# Patient Record
Sex: MALE | Race: WHITE | NOT HISPANIC OR LATINO
[De-identification: names, ages, dates, MRNs, and addresses within clinical notes are randomized per-mention and may not be internally consistent; named-entity substitution may affect disease eponyms.]

---

## 2017-07-06 ENCOUNTER — RX ONLY (OUTPATIENT)
Age: 52
Setting detail: RX ONLY
End: 2017-07-06

## 2017-09-24 ENCOUNTER — APPOINTMENT (OUTPATIENT)
Dept: RADIOLOGY | Facility: MEDICAL CENTER | Age: 52
DRG: 641 | End: 2017-09-24
Attending: EMERGENCY MEDICINE
Payer: MEDICAID

## 2017-09-24 ENCOUNTER — HOSPITAL ENCOUNTER (INPATIENT)
Facility: MEDICAL CENTER | Age: 52
LOS: 1 days | DRG: 641 | End: 2017-09-25
Attending: EMERGENCY MEDICINE | Admitting: HOSPITALIST
Payer: MEDICAID

## 2017-09-24 DIAGNOSIS — R53.1 WEAKNESS: ICD-10-CM

## 2017-09-24 DIAGNOSIS — E87.1 HYPONATREMIA: ICD-10-CM

## 2017-09-24 LAB
ALBUMIN SERPL BCP-MCNC: 4.4 G/DL (ref 3.2–4.9)
ALBUMIN/GLOB SERPL: 1.5 G/DL
ALP SERPL-CCNC: 71 U/L (ref 30–99)
ALT SERPL-CCNC: 34 U/L (ref 2–50)
ANION GAP SERPL CALC-SCNC: 9 MMOL/L (ref 0–11.9)
AST SERPL-CCNC: 25 U/L (ref 12–45)
BASOPHILS # BLD AUTO: 1.4 % (ref 0–1.8)
BASOPHILS # BLD: 0.13 K/UL (ref 0–0.12)
BILIRUB SERPL-MCNC: 0.5 MG/DL (ref 0.1–1.5)
BNP SERPL-MCNC: 2 PG/ML (ref 0–100)
BUN SERPL-MCNC: 10 MG/DL (ref 8–22)
CALCIUM SERPL-MCNC: 9.8 MG/DL (ref 8.5–10.5)
CHLORIDE SERPL-SCNC: 96 MMOL/L (ref 96–112)
CO2 SERPL-SCNC: 23 MMOL/L (ref 20–33)
CREAT SERPL-MCNC: 0.57 MG/DL (ref 0.5–1.4)
EOSINOPHIL # BLD AUTO: 0.33 K/UL (ref 0–0.51)
EOSINOPHIL NFR BLD: 3.5 % (ref 0–6.9)
ERYTHROCYTE [DISTWIDTH] IN BLOOD BY AUTOMATED COUNT: 38.6 FL (ref 35.9–50)
GFR SERPL CREATININE-BSD FRML MDRD: >60 ML/MIN/1.73 M 2
GLOBULIN SER CALC-MCNC: 3 G/DL (ref 1.9–3.5)
GLUCOSE BLD-MCNC: 107 MG/DL (ref 65–99)
GLUCOSE SERPL-MCNC: 110 MG/DL (ref 65–99)
HCT VFR BLD AUTO: 45.8 % (ref 42–52)
HGB BLD-MCNC: 16.2 G/DL (ref 14–18)
IMM GRANULOCYTES # BLD AUTO: 0.06 K/UL (ref 0–0.11)
IMM GRANULOCYTES NFR BLD AUTO: 0.6 % (ref 0–0.9)
LYMPHOCYTES # BLD AUTO: 2.8 K/UL (ref 1–4.8)
LYMPHOCYTES NFR BLD: 29.5 % (ref 22–41)
MCH RBC QN AUTO: 29.7 PG (ref 27–33)
MCHC RBC AUTO-ENTMCNC: 35.4 G/DL (ref 33.7–35.3)
MCV RBC AUTO: 84 FL (ref 81.4–97.8)
MONOCYTES # BLD AUTO: 0.89 K/UL (ref 0–0.85)
MONOCYTES NFR BLD AUTO: 9.4 % (ref 0–13.4)
NEUTROPHILS # BLD AUTO: 5.27 K/UL (ref 1.82–7.42)
NEUTROPHILS NFR BLD: 55.6 % (ref 44–72)
NRBC # BLD AUTO: 0 K/UL
NRBC BLD AUTO-RTO: 0 /100 WBC
PLATELET # BLD AUTO: 246 K/UL (ref 164–446)
PMV BLD AUTO: 10.3 FL (ref 9–12.9)
POTASSIUM SERPL-SCNC: 4.3 MMOL/L (ref 3.6–5.5)
PROT SERPL-MCNC: 7.4 G/DL (ref 6–8.2)
RBC # BLD AUTO: 5.45 M/UL (ref 4.7–6.1)
SODIUM SERPL-SCNC: 128 MMOL/L (ref 135–145)
WBC # BLD AUTO: 9.5 K/UL (ref 4.8–10.8)

## 2017-09-24 PROCEDURE — 82962 GLUCOSE BLOOD TEST: CPT

## 2017-09-24 PROCEDURE — 80053 COMPREHEN METABOLIC PANEL: CPT

## 2017-09-24 PROCEDURE — 71010 DX-CHEST-LIMITED (1 VIEW): CPT | Performed by: EMERGENCY MEDICINE

## 2017-09-24 PROCEDURE — 94760 N-INVAS EAR/PLS OXIMETRY 1: CPT

## 2017-09-24 PROCEDURE — 71010 DX-CHEST-LIMITED (1 VIEW): CPT

## 2017-09-24 PROCEDURE — 36415 COLL VENOUS BLD VENIPUNCTURE: CPT

## 2017-09-24 PROCEDURE — 87040 BLOOD CULTURE FOR BACTERIA: CPT

## 2017-09-24 PROCEDURE — 83880 ASSAY OF NATRIURETIC PEPTIDE: CPT

## 2017-09-24 PROCEDURE — 99285 EMERGENCY DEPT VISIT HI MDM: CPT

## 2017-09-24 PROCEDURE — 85025 COMPLETE CBC W/AUTO DIFF WBC: CPT

## 2017-09-24 RX ORDER — CLONIDINE HYDROCHLORIDE 0.1 MG/1
0.1 TABLET ORAL ONCE
Status: DISCONTINUED | OUTPATIENT
Start: 2017-09-25 | End: 2017-09-24 | Stop reason: CLARIF

## 2017-09-24 RX ORDER — SODIUM CHLORIDE 9 MG/ML
1000 INJECTION, SOLUTION INTRAVENOUS ONCE
Status: DISCONTINUED | OUTPATIENT
Start: 2017-09-25 | End: 2017-09-24 | Stop reason: CLARIF

## 2017-09-24 ASSESSMENT — LIFESTYLE VARIABLES: DO YOU DRINK ALCOHOL: NO

## 2017-09-24 NOTE — LETTER
Internal   Medicine      [facility]   Hospitalist   Service     September 25, 2017    Patient: El Duran III   YOB: 1965   Date of Visit: 9/24/2017 - 9/25/17       To Whom It May Concern:    El Duran was seen and treated in our hospital.  He was admitted on 9/24/2017.    He was discharged on 9/25/2017.  Please excuse him from his work during this time.   Additionally, he should refrain from work for the next couple of days.  He should be able to return to work on 9/28/17.      Sincerely,           MARIANA MCDANIELS M.D.  [facility] Hospitalist Service,  Dept: [phone]

## 2017-09-25 ENCOUNTER — APPOINTMENT (OUTPATIENT)
Dept: RADIOLOGY | Facility: MEDICAL CENTER | Age: 52
DRG: 641 | End: 2017-09-25
Attending: EMERGENCY MEDICINE
Payer: MEDICAID

## 2017-09-25 ENCOUNTER — RESOLUTE PROFESSIONAL BILLING HOSPITAL PROF FEE (OUTPATIENT)
Dept: HOSPITALIST | Facility: MEDICAL CENTER | Age: 52
End: 2017-09-25
Payer: MEDICAID

## 2017-09-25 VITALS
WEIGHT: 214.95 LBS | HEIGHT: 69 IN | HEART RATE: 99 BPM | BODY MASS INDEX: 31.84 KG/M2 | OXYGEN SATURATION: 96 % | RESPIRATION RATE: 18 BRPM | DIASTOLIC BLOOD PRESSURE: 83 MMHG | SYSTOLIC BLOOD PRESSURE: 128 MMHG | TEMPERATURE: 98.4 F

## 2017-09-25 PROBLEM — E87.1 HYPONATREMIA: Status: ACTIVE | Noted: 2017-09-25

## 2017-09-25 LAB
AMPHET UR QL SCN: NEGATIVE
ANION GAP SERPL CALC-SCNC: 7 MMOL/L (ref 0–11.9)
ANION GAP SERPL CALC-SCNC: 7 MMOL/L (ref 0–11.9)
APPEARANCE UR: CLEAR
APPEARANCE UR: CLEAR
BARBITURATES UR QL SCN: NEGATIVE
BENZODIAZ UR QL SCN: NEGATIVE
BILIRUB UR QL STRIP.AUTO: NEGATIVE
BILIRUB UR QL STRIP.AUTO: NEGATIVE
BUN SERPL-MCNC: 12 MG/DL (ref 8–22)
BUN SERPL-MCNC: 9 MG/DL (ref 8–22)
BZE UR QL SCN: NEGATIVE
CALCIUM SERPL-MCNC: 9.1 MG/DL (ref 8.5–10.5)
CALCIUM SERPL-MCNC: 9.8 MG/DL (ref 8.5–10.5)
CANNABINOIDS UR QL SCN: NEGATIVE
CHLORIDE SERPL-SCNC: 101 MMOL/L (ref 96–112)
CHLORIDE SERPL-SCNC: 101 MMOL/L (ref 96–112)
CO2 SERPL-SCNC: 24 MMOL/L (ref 20–33)
CO2 SERPL-SCNC: 26 MMOL/L (ref 20–33)
COLOR UR: YELLOW
COLOR UR: YELLOW
CREAT SERPL-MCNC: 0.64 MG/DL (ref 0.5–1.4)
CREAT SERPL-MCNC: 0.67 MG/DL (ref 0.5–1.4)
CREAT UR-MCNC: 28.1 MG/DL
EKG IMPRESSION: NORMAL
GFR SERPL CREATININE-BSD FRML MDRD: >60 ML/MIN/1.73 M 2
GFR SERPL CREATININE-BSD FRML MDRD: >60 ML/MIN/1.73 M 2
GLUCOSE SERPL-MCNC: 100 MG/DL (ref 65–99)
GLUCOSE SERPL-MCNC: 157 MG/DL (ref 65–99)
GLUCOSE UR STRIP.AUTO-MCNC: NEGATIVE MG/DL
GLUCOSE UR STRIP.AUTO-MCNC: NEGATIVE MG/DL
KETONES UR STRIP.AUTO-MCNC: NEGATIVE MG/DL
KETONES UR STRIP.AUTO-MCNC: NEGATIVE MG/DL
LEUKOCYTE ESTERASE UR QL STRIP.AUTO: NEGATIVE
LEUKOCYTE ESTERASE UR QL STRIP.AUTO: NEGATIVE
METHADONE UR QL SCN: NEGATIVE
MICRO URNS: NORMAL
MICRO URNS: NORMAL
NITRITE UR QL STRIP.AUTO: NEGATIVE
NITRITE UR QL STRIP.AUTO: NEGATIVE
OPIATES UR QL SCN: NEGATIVE
OSMOLALITY SERPL: 284 MOSM/KG H2O (ref 278–298)
OSMOLALITY UR: 149 MOSM/KG H2O (ref 300–900)
OXYCODONE UR QL SCN: NEGATIVE
PCP UR QL SCN: NEGATIVE
PH UR STRIP.AUTO: 6 [PH]
PH UR STRIP.AUTO: 7 [PH]
POTASSIUM SERPL-SCNC: 4.1 MMOL/L (ref 3.6–5.5)
POTASSIUM SERPL-SCNC: 4.5 MMOL/L (ref 3.6–5.5)
PROPOXYPH UR QL SCN: NEGATIVE
PROT UR QL STRIP: NEGATIVE MG/DL
PROT UR QL STRIP: NEGATIVE MG/DL
RBC UR QL AUTO: NEGATIVE
RBC UR QL AUTO: NEGATIVE
SODIUM SERPL-SCNC: 132 MMOL/L (ref 135–145)
SODIUM SERPL-SCNC: 134 MMOL/L (ref 135–145)
SODIUM UR-SCNC: 24 MMOL/L
SP GR UR STRIP.AUTO: 1
SP GR UR STRIP.AUTO: 1.01
TSH SERPL DL<=0.005 MIU/L-ACNC: 2.9 UIU/ML (ref 0.3–3.7)
UROBILINOGEN UR STRIP.AUTO-MCNC: 0.2 MG/DL
UROBILINOGEN UR STRIP.AUTO-MCNC: 0.2 MG/DL

## 2017-09-25 PROCEDURE — 90471 IMMUNIZATION ADMIN: CPT

## 2017-09-25 PROCEDURE — 83935 ASSAY OF URINE OSMOLALITY: CPT

## 2017-09-25 PROCEDURE — A9270 NON-COVERED ITEM OR SERVICE: HCPCS | Performed by: INTERNAL MEDICINE

## 2017-09-25 PROCEDURE — 770020 HCHG ROOM/CARE - TELE (206)

## 2017-09-25 PROCEDURE — 700111 HCHG RX REV CODE 636 W/ 250 OVERRIDE (IP): Performed by: EMERGENCY MEDICINE

## 2017-09-25 PROCEDURE — 70450 CT HEAD/BRAIN W/O DYE: CPT

## 2017-09-25 PROCEDURE — 80048 BASIC METABOLIC PNL TOTAL CA: CPT

## 2017-09-25 PROCEDURE — 94760 N-INVAS EAR/PLS OXIMETRY 1: CPT

## 2017-09-25 PROCEDURE — 93005 ELECTROCARDIOGRAM TRACING: CPT | Performed by: STUDENT IN AN ORGANIZED HEALTH CARE EDUCATION/TRAINING PROGRAM

## 2017-09-25 PROCEDURE — 3E0234Z INTRODUCTION OF SERUM, TOXOID AND VACCINE INTO MUSCLE, PERCUTANEOUS APPROACH: ICD-10-PCS | Performed by: HOSPITALIST

## 2017-09-25 PROCEDURE — 82570 ASSAY OF URINE CREATININE: CPT

## 2017-09-25 PROCEDURE — 83930 ASSAY OF BLOOD OSMOLALITY: CPT

## 2017-09-25 PROCEDURE — 90686 IIV4 VACC NO PRSV 0.5 ML IM: CPT | Performed by: STUDENT IN AN ORGANIZED HEALTH CARE EDUCATION/TRAINING PROGRAM

## 2017-09-25 PROCEDURE — 84443 ASSAY THYROID STIM HORMONE: CPT

## 2017-09-25 PROCEDURE — 80307 DRUG TEST PRSMV CHEM ANLYZR: CPT

## 2017-09-25 PROCEDURE — 700111 HCHG RX REV CODE 636 W/ 250 OVERRIDE (IP): Performed by: STUDENT IN AN ORGANIZED HEALTH CARE EDUCATION/TRAINING PROGRAM

## 2017-09-25 PROCEDURE — 84300 ASSAY OF URINE SODIUM: CPT

## 2017-09-25 PROCEDURE — 36415 COLL VENOUS BLD VENIPUNCTURE: CPT

## 2017-09-25 PROCEDURE — 700102 HCHG RX REV CODE 250 W/ 637 OVERRIDE(OP): Performed by: INTERNAL MEDICINE

## 2017-09-25 PROCEDURE — 96375 TX/PRO/DX INJ NEW DRUG ADDON: CPT

## 2017-09-25 PROCEDURE — 700105 HCHG RX REV CODE 258: Performed by: EMERGENCY MEDICINE

## 2017-09-25 PROCEDURE — 302135 SEQUENTIAL COMPRESSION MACHINE: Performed by: HOSPITALIST

## 2017-09-25 PROCEDURE — 99235 HOSP IP/OBS SAME DATE MOD 70: CPT | Mod: GC | Performed by: HOSPITALIST

## 2017-09-25 PROCEDURE — 81003 URINALYSIS AUTO W/O SCOPE: CPT

## 2017-09-25 PROCEDURE — 93010 ELECTROCARDIOGRAM REPORT: CPT | Performed by: INTERNAL MEDICINE

## 2017-09-25 PROCEDURE — 96361 HYDRATE IV INFUSION ADD-ON: CPT

## 2017-09-25 PROCEDURE — 96374 THER/PROPH/DIAG INJ IV PUSH: CPT

## 2017-09-25 RX ORDER — POLYETHYLENE GLYCOL 3350 17 G/17G
1 POWDER, FOR SOLUTION ORAL
Status: CANCELLED | OUTPATIENT
Start: 2017-09-25

## 2017-09-25 RX ORDER — OXCARBAZEPINE 150 MG/1
450 TABLET, FILM COATED ORAL 2 TIMES DAILY
Qty: 180 TAB | Refills: 0 | Status: SHIPPED
Start: 2017-09-25 | End: 2020-09-28

## 2017-09-25 RX ORDER — LISINOPRIL 20 MG/1
20 TABLET ORAL DAILY
Status: DISCONTINUED | OUTPATIENT
Start: 2017-09-26 | End: 2017-09-25 | Stop reason: HOSPADM

## 2017-09-25 RX ORDER — AMOXICILLIN 250 MG
2 CAPSULE ORAL 2 TIMES DAILY
Status: DISCONTINUED | OUTPATIENT
Start: 2017-09-25 | End: 2017-09-25 | Stop reason: HOSPADM

## 2017-09-25 RX ORDER — BISACODYL 10 MG
10 SUPPOSITORY, RECTAL RECTAL
Status: DISCONTINUED | OUTPATIENT
Start: 2017-09-25 | End: 2017-09-25 | Stop reason: HOSPADM

## 2017-09-25 RX ORDER — LISINOPRIL 20 MG/1
40 TABLET ORAL DAILY
Status: DISCONTINUED | OUTPATIENT
Start: 2017-09-25 | End: 2017-09-25

## 2017-09-25 RX ORDER — AMOXICILLIN 250 MG
2 CAPSULE ORAL 2 TIMES DAILY
Status: CANCELLED | OUTPATIENT
Start: 2017-09-25

## 2017-09-25 RX ORDER — METOCLOPRAMIDE HYDROCHLORIDE 5 MG/ML
10 INJECTION INTRAMUSCULAR; INTRAVENOUS ONCE
Status: COMPLETED | OUTPATIENT
Start: 2017-09-25 | End: 2017-09-25

## 2017-09-25 RX ORDER — LORAZEPAM 2 MG/ML
1 INJECTION INTRAMUSCULAR ONCE
Status: COMPLETED | OUTPATIENT
Start: 2017-09-25 | End: 2017-09-25

## 2017-09-25 RX ORDER — LISINOPRIL 20 MG/1
20 TABLET ORAL DAILY
Qty: 30 TAB | Refills: 0 | Status: SHIPPED
Start: 2017-09-26 | End: 2020-09-28

## 2017-09-25 RX ORDER — BISACODYL 10 MG
10 SUPPOSITORY, RECTAL RECTAL
Status: CANCELLED | OUTPATIENT
Start: 2017-09-25

## 2017-09-25 RX ORDER — BUTALBITAL, ACETAMINOPHEN AND CAFFEINE 50; 325; 40 MG/1; MG/1; MG/1
1 TABLET ORAL EVERY 6 HOURS PRN
Qty: 16 TAB | Refills: 0 | Status: SHIPPED | OUTPATIENT
Start: 2017-09-25 | End: 2017-09-29

## 2017-09-25 RX ORDER — SODIUM CHLORIDE 9 MG/ML
1000 INJECTION, SOLUTION INTRAVENOUS ONCE
Status: COMPLETED | OUTPATIENT
Start: 2017-09-25 | End: 2017-09-25

## 2017-09-25 RX ORDER — POLYETHYLENE GLYCOL 3350 17 G/17G
1 POWDER, FOR SOLUTION ORAL
Status: DISCONTINUED | OUTPATIENT
Start: 2017-09-25 | End: 2017-09-25 | Stop reason: HOSPADM

## 2017-09-25 RX ORDER — BUTALBITAL, ACETAMINOPHEN AND CAFFEINE 50; 325; 40 MG/1; MG/1; MG/1
1 TABLET ORAL EVERY 6 HOURS PRN
Status: DISCONTINUED | OUTPATIENT
Start: 2017-09-25 | End: 2017-09-25 | Stop reason: HOSPADM

## 2017-09-25 RX ORDER — ATORVASTATIN CALCIUM 20 MG/1
20 TABLET, FILM COATED ORAL NIGHTLY
COMMUNITY
End: 2020-09-28

## 2017-09-25 RX ADMIN — METOCLOPRAMIDE 10 MG: 5 INJECTION, SOLUTION INTRAMUSCULAR; INTRAVENOUS at 00:39

## 2017-09-25 RX ADMIN — INFLUENZA A VIRUS A/MICHIGAN/45/2015 X-275 (H1N1) ANTIGEN (FORMALDEHYDE INACTIVATED), INFLUENZA A VIRUS A/HONG KONG/4801/2014 X-263B (H3N2) ANTIGEN (FORMALDEHYDE INACTIVATED), INFLUENZA B VIRUS B/PHUKET/3073/2013 ANTIGEN (FORMALDEHYDE INACTIVATED), AND INFLUENZA B VIRUS B/BRISBANE/60/2008 ANTIGEN (FORMALDEHYDE INACTIVATED) 0.5 ML: 15; 15; 15; 15 INJECTION, SUSPENSION INTRAMUSCULAR at 18:04

## 2017-09-25 RX ADMIN — LORAZEPAM 1 MG: 2 INJECTION INTRAMUSCULAR; INTRAVENOUS at 02:00

## 2017-09-25 RX ADMIN — LISINOPRIL 40 MG: 20 TABLET ORAL at 09:00

## 2017-09-25 RX ADMIN — ENOXAPARIN SODIUM 40 MG: 100 INJECTION SUBCUTANEOUS at 09:00

## 2017-09-25 RX ADMIN — SODIUM CHLORIDE 1000 ML: 9 INJECTION, SOLUTION INTRAVENOUS at 00:30

## 2017-09-25 ASSESSMENT — ENCOUNTER SYMPTOMS
DIARRHEA: 0
DIZZINESS: 1
TINGLING: 0
FOCAL WEAKNESS: 0
COUGH: 0
FEVER: 0
VOMITING: 0
ABDOMINAL PAIN: 0
SENSORY CHANGE: 0
BLURRED VISION: 1
WEAKNESS: 1
CHILLS: 0
PALPITATIONS: 0
SHORTNESS OF BREATH: 0
NAUSEA: 0
HEADACHES: 1

## 2017-09-25 ASSESSMENT — LIFESTYLE VARIABLES
ALCOHOL_USE: NO
EVER_SMOKED: NEVER

## 2017-09-25 ASSESSMENT — PAIN SCALES - GENERAL
PAINLEVEL_OUTOF10: 0
PAINLEVEL_OUTOF10: 0

## 2017-09-25 ASSESSMENT — PATIENT HEALTH QUESTIONNAIRE - PHQ9
SUM OF ALL RESPONSES TO PHQ9 QUESTIONS 1 AND 2: 0
1. LITTLE INTEREST OR PLEASURE IN DOING THINGS: NOT AT ALL
2. FEELING DOWN, DEPRESSED, IRRITABLE, OR HOPELESS: NOT AT ALL
SUM OF ALL RESPONSES TO PHQ QUESTIONS 1-9: 0

## 2017-09-25 ASSESSMENT — COPD QUESTIONNAIRES
DO YOU EVER COUGH UP ANY MUCUS OR PHLEGM?: NO/ONLY WITH OCCASIONAL COLDS OR INFECTIONS
COPD SCREENING SCORE: 1
HAVE YOU SMOKED AT LEAST 100 CIGARETTES IN YOUR ENTIRE LIFE: NO/DON'T KNOW
DURING THE PAST 4 WEEKS HOW MUCH DID YOU FEEL SHORT OF BREATH: NONE/LITTLE OF THE TIME

## 2017-09-25 NOTE — ED NOTES
Patient ambulatory to triage with steady gait. Blood drawn per protocol and sent to lab. Patient provided with specimen container and instructed on clean catch urine sample. Apologized for wait time and returned to lobby.

## 2017-09-25 NOTE — ED NOTES
Pt appearing more relaxed, states he is hungry which is contributing to his feeling antsy. Given meal. Waiting for pt to feel less antsy/agitated prior to bringing to floor.

## 2017-09-25 NOTE — CARE PLAN
Problem: Venous Thromboembolism (VTW)/Deep Vein Thrombosis (DVT) Prevention:  Goal: Patient will participate in Venous Thrombosis (VTE)/Deep Vein Thrombosis (DVT)Prevention Measures  Outcome: PROGRESSING AS EXPECTED      Problem: Mobility  Goal: Risk for activity intolerance will decrease  Outcome: PROGRESSING AS EXPECTED  Pt ambulating around room, does not need assistance. Uses call light when appropriate.

## 2017-09-25 NOTE — ED NOTES
Pt up to commode, concerns for too unsteady to walk to BR. x1 BM. 1mg ativan ordered by ERP, given to pt.

## 2017-09-25 NOTE — PROGRESS NOTES
Pt arrived to floor via er stretcher, vital signs stable afebrile, denies pain, orders received and implemented, cont to monitor and treat as per dr orders.

## 2017-09-25 NOTE — ASSESSMENT & PLAN NOTE
- Most likely secondary to trileptal  - Sodium on arrival - 128, calculated serum osmolality is 266 (hypotonic hyponatremia), no hyperglycemia (glucose 110)  - No use of thiazide diuretics  - No peripheral edema, no signs of hypovolemia  - Patient received 1 L NS in the ER  - Fluid restriction to 1L  - Will order serum osmolarity, urine sodium, creatinine and osmolarity, TSH  - F/u repeat Na  - Hold trileptal for no

## 2017-09-25 NOTE — ASSESSMENT & PLAN NOTE
- As per patient, he was asked to stop taking insulin as he was found to be hypoglycemic  - Home medication metformin on hold  - Glucose on admission was 110  - Monitor

## 2017-09-25 NOTE — PROGRESS NOTES
Med Rec completed per patient at bedside.  Allergies reviewed   No antibiotics within the last 30 days.

## 2017-09-25 NOTE — H&P
"      Internal Medicine Admitting History and Physical    Name El Duran     1965   Age/Sex 51 y.o. male   MRN [id number]   Code Status Full code     After 5PM or if no immediate response to page, please call for cross-coverage  Attending/Team: Dr. Carlson/ Cm See Patient List for primary contact information  Call [phone] to page    1st Call - Day Intern (R1):   Dr. Rowe 2nd Call - Day Sr. Resident (R2/R3):   Dr. Asif       Chief Complaint:  Headache, dizziness  Generalized weakness    HPI:  Patient is a 51 year old with past medical history of hypertension, diabetes mellitus, hyperlipidemia and bipolar disorder who presents to the ER with complaints of headache that has been going on for a couple of weeks. He describes the headache as pounding, diffuse and rates it a 7/10. He has been taking ibuprofen for the pain with not much relief. Patient also reports dizziness , blurry vision and generalized weakness. Patient was admitted to [facility] last  for these symptoms, was treated for hyponatremia. As per the patient, he was also found to have low glucose and was asked to stop using his insulin. Patient stated that he felt fine on discharge but within 2-3 days the headache and blurry vision returned. Today, he developed dizziness with generalized weakness and he thought he was unsteady on his feet which made him decide to come to the ER. Denies nausea, vomiting, fever, chills, diarrhea, abdominal pain, focal motor/ sensory changes, chest pain, palpitations. He reports drinking a gallon of water every day.    Vitals stable on arrival in the ER. Patient very restless at the time of the interview. Kept saying that he felt \"edgy\". He had received metoclopramide 10 mg IV.     Review of Systems   Constitutional: Negative for chills and fever.   Eyes: Positive for blurred vision.   Respiratory: Negative for cough and shortness of breath.    Cardiovascular: Negative for chest pain and " "palpitations.   Gastrointestinal: Negative for abdominal pain, diarrhea, nausea and vomiting.   Genitourinary: Negative for dysuria, frequency and urgency.   Neurological: Positive for dizziness, weakness and headaches. Negative for tingling, sensory change and focal weakness.             Past Medical History:   Past Medical History:   Diagnosis Date   • ASTHMA     seasonal asthma   • Depression    • Hypertension    • IV drug user        Past Surgical History:  No past surgical history on file.    Current Outpatient Medications:  Home Medications    **Home medications have not yet been reviewed for this encounter**         Medication Allergy/Sensitivities:  Allergies   Allergen Reactions   • Benadryl [Diphenhydramine] Anxiety   • Codeine Hives         Family History:  Family History   Problem Relation Age of Onset   • Heart Disease Father      MI at 59   • Diabetes Maternal Grandmother    • Hypertension Maternal Grandmother    • Cancer Paternal Grandfather      brain tumor        Social History:  Social History     Social History   • Marital status:      Spouse name: N/A   • Number of children: N/A   • Years of education: N/A     Occupational History   • Not on file.     Social History Main Topics   • Smoking status: Never Smoker   • Smokeless tobacco: Never Used      Comment: 2 cigars a day   • Alcohol use No      Comment: DENIES   • Drug use: No      Comment: history of meth use - years ago   • Sexual activity: Yes     Partners: Female      Comment:  6 years      Other Topics Concern   • Not on file     Social History Narrative    ** Merged History Encounter **            Physical Exam     Vitals:    09/25/17 0206 09/25/17 0230 09/25/17 0344 09/25/17 0510   BP:   135/75    Pulse: 80 80 65 66   Resp:   18 18   Temp:   36.7 °C (98.1 °F)    SpO2: 95% 95% 94% 96%   Weight:   97.5 kg (214 lb 15.2 oz)    Height:   1.753 m (5' 9\")      Body mass index is 31.74 kg/m².  /75   Pulse 66   Temp 36.7 °C " "(98.1 °F)   Resp 18   Ht 1.753 m (5' 9\")   Wt 97.5 kg (214 lb 15.2 oz)   SpO2 96%   BMI 31.74 kg/m²   O2 therapy: Pulse Oximetry: 96 %, O2 (LPM): 0, O2 Delivery: None (Room Air)    Physical Exam   Constitutional: He is oriented to person, place, and time.   Well developed, well nourished, restless , sitting up often, asking to walk around   HENT:   Head: Normocephalic and atraumatic.   Eyes: EOM are normal. Pupils are equal, round, and reactive to light.   Cardiovascular: Normal rate, regular rhythm and normal heart sounds.    No murmur heard.  Pulmonary/Chest: Effort normal and breath sounds normal. No respiratory distress. He has no wheezes. He has no rales.   Abdominal: Soft. Bowel sounds are normal. He exhibits no distension. There is no tenderness.   Musculoskeletal: He exhibits no edema.   Neurological: He is alert and oriented to person, place, and time. He has normal reflexes. No cranial nerve deficit.             Data Review       Old Records Request:   Deferred  Current Records review and summary: Completed    Lab Data Review:  Recent Results (from the past 24 hour(s))   ACCU-CHEK GLUCOSE    Collection Time: 09/24/17 10:08 PM   Result Value Ref Range    Glucose - Accu-Ck 107 (H) 65 - 99 mg/dL   BTYPE NATRIURETIC PEPTIDE    Collection Time: 09/24/17 10:58 PM   Result Value Ref Range    B Natriuretic Peptide 2 0 - 100 pg/mL   CBC WITH DIFFERENTIAL    Collection Time: 09/24/17 10:58 PM   Result Value Ref Range    WBC 9.5 4.8 - 10.8 K/uL    RBC 5.45 4.70 - 6.10 M/uL    Hemoglobin 16.2 14.0 - 18.0 g/dL    Hematocrit 45.8 42.0 - 52.0 %    MCV 84.0 81.4 - 97.8 fL    MCH 29.7 27.0 - 33.0 pg    MCHC 35.4 (H) 33.7 - 35.3 g/dL    RDW 38.6 35.9 - 50.0 fL    Platelet Count 246 164 - 446 K/uL    MPV 10.3 9.0 - 12.9 fL    Neutrophils-Polys 55.60 44.00 - 72.00 %    Lymphocytes 29.50 22.00 - 41.00 %    Monocytes 9.40 0.00 - 13.40 %    Eosinophils 3.50 0.00 - 6.90 %    Basophils 1.40 0.00 - 1.80 %    Immature " Granulocytes 0.60 0.00 - 0.90 %    Nucleated RBC 0.00 /100 WBC    Neutrophils (Absolute) 5.27 1.82 - 7.42 K/uL    Lymphs (Absolute) 2.80 1.00 - 4.80 K/uL    Monos (Absolute) 0.89 (H) 0.00 - 0.85 K/uL    Eos (Absolute) 0.33 0.00 - 0.51 K/uL    Baso (Absolute) 0.13 (H) 0.00 - 0.12 K/uL    Immature Granulocytes (abs) 0.06 0.00 - 0.11 K/uL    NRBC (Absolute) 0.00 K/uL   COMP METABOLIC PANEL    Collection Time: 09/24/17 10:58 PM   Result Value Ref Range    Sodium 128 (L) 135 - 145 mmol/L    Potassium 4.3 3.6 - 5.5 mmol/L    Chloride 96 96 - 112 mmol/L    Co2 23 20 - 33 mmol/L    Anion Gap 9.0 0.0 - 11.9    Glucose 110 (H) 65 - 99 mg/dL    Bun 10 8 - 22 mg/dL    Creatinine 0.57 0.50 - 1.40 mg/dL    Calcium 9.8 8.5 - 10.5 mg/dL    AST(SGOT) 25 12 - 45 U/L    ALT(SGPT) 34 2 - 50 U/L    Alkaline Phosphatase 71 30 - 99 U/L    Total Bilirubin 0.5 0.1 - 1.5 mg/dL    Albumin 4.4 3.2 - 4.9 g/dL    Total Protein 7.4 6.0 - 8.2 g/dL    Globulin 3.0 1.9 - 3.5 g/dL    A-G Ratio 1.5 g/dL   ESTIMATED GFR    Collection Time: 09/24/17 10:58 PM   Result Value Ref Range    GFR If African American >60 >60 mL/min/1.73 m 2    GFR If Non African American >60 >60 mL/min/1.73 m 2   URINE DRUG SCREEN    Collection Time: 09/25/17  1:35 AM   Result Value Ref Range    Amphetamines Urine Negative Negative    Barbiturates Negative Negative    Benzodiazepines Negative Negative    Cocaine Metabolite Negative Negative    Methadone Negative Negative    Opiates Negative Negative    Oxycodone Negative Negative    Phencyclidine -Pcp Negative Negative    Propoxyphene Negative Negative    Cannabinoid Metab Negative Negative       Imaging/Procedures Review:    ndependant Imaging Review: Completed  DX-CHEST-LIMITED (1 VIEW)   Final Result         No significant interval change. Trace right pleural effusion/thickening.      CT-HEAD W/O    (Results Pending)            EKG:   EKG Independant Review: Deferred             Assessment/Plan     Hyponatremia  - Most  likely secondary to trileptal  - Sodium on arrival - 128, calculated serum osmolality is 266 (hypotonic hyponatremia), no hyperglycemia (glucose 110)  - No use of thiazide diuretics  - No peripheral edema, no signs of hypovolemia  - Patient received 1 L NS in the ER  - Fluid restriction to 1L  - Will order serum osmolarity, urine sodium, creatinine and osmolarity, TSH  - F/u repeat Na  - Hold trileptal for now    Hypertension  - BP has been stable since arrival  - Patient started on home medication lisinopril     Diabetes mellitus  - As per patient, he was asked to stop taking insulin as he was found to be hypoglycemic  - Home medication metformin on hold  - Glucose on admission was 110  - Monitor    Bipolar disorder  - Trileptal on hold for now  - Day team to decide on further management      Anticipated Hospital stay:  >2 midnights        Quality Measures    Reviewed items::  Labs reviewed, Medications reviewed and Radiology images reviewed  Wong catheter::  No Wong  DVT prophylaxis pharmacological::  Enoxaparin (Lovenox)

## 2017-09-25 NOTE — ED NOTES
"Chief Complaint   Patient presents with   • Shortness of Breath     onset today   • Dizziness     seen at [employer] and hospitalized for same along w/ hypoglycemic, released on 18th.    • Malaise     \"I feel weak all over\"      Pt ambulatory to triage by self for above, Pt appears highly anxious, uncomfortable.  in triage. Pt CMS intact in extremities, A/O x4. Pt also states his urine is unusually dark. Pt placed back in lobby, told to alert staff to any sudden changes or worsening in condition.    Blood pressure 148/96, pulse 98, temperature 36.1 °C (96.9 °F), resp. rate 16, height 1.753 m (5' 9\"), weight 97.7 kg (215 lb 6.2 oz), SpO2 98 %.      "

## 2017-09-25 NOTE — PROGRESS NOTES
Bedside report completed. Assumed patient care. Patient sitting up in bed comfortably. AOx4. Anxious, states wanting to be discharged this morning. Complaining of burred vision, MD aware and at bedside. Bed locked in lowest position, call light in reach, bed alarm on. Will continue to monitor.

## 2017-09-25 NOTE — DISCHARGE SUMMARY
Internal Medicine Discharge Summary      Admit Date:  9/24/2017       Discharge Date:   9/25/2017     Service:      R Internal Medicine formerly [facility] Team  Attending Physician(s):    Aldo   Senior Resident(s):     Laay   Daniele Resident(s):     Rajiv     Primary Diagnosis:   Hyponatremia     Secondary Diagnoses:                Hypertension   Bipolar disorder     Hospital Summary (Brief Narrative):       51 year old with past medical history of hypertension, diabetes mellitus, hyperlipidemia and bipolar disorder admitted 9/24/2017 for lightheadedness, generalized weakness, blurry vision, and headache of one day duration, his was found to have a sodium level of 128, serum osmolarity of 284, and low urine osmolarity. Findings consistent with psychogenic polydipsia. Patient was drinking one gallon of water a day in addition to 3 half gallons of juice. He also is on Lisinopril 40 mg, Trileptal and Seroquel (qhs-prn). His Na level improved to 132, then 134 with moderate free water restriction, his lightheadedness and blurry vision improved. His Lisinopril was reduced to 20 mg, and his Trileptal was reduced to 450 mg BID, as both medications have been associated with hyponatremia. Patient advised to follow with his psychiatrist to discuss further reducing or changing Trileptal to a different bipolar medication.  He also notes that he only takes Seroquel for sleep (prn), and that he has been sleeping pretty well recently, so he probably won't need it soon.     Additionally, he has noted that some of these episodes are accompanied by a racing heart and mild anxiety about his work.  He notes that he feels that he needs to get a different job (due to the stress), and that he has a different job interview tomorrow.       Imaging/ Testing:      CT-HEAD W/O   Final Result         1. No acute intracranial abnormality. No evidence of acute intracranial hemorrhage or mass lesion.               DX-CHEST-LIMITED (1  VIEW)   Final Result         No significant interval change. Trace right pleural effusion/thickening.          Discharge Medications:         Medication Reconciliation: Completed       Medication List      START taking these medications      Instructions   acetaminophen/caffeine/butalbital 325-40-50 mg -40 MG Tabs  Commonly known as:  FIORICET   Take 1 Tab by mouth every 6 hours as needed for Headache (For SEVERE headaches.) for up to 4 days.  Dose:  1 Tab        CHANGE how you take these medications      Instructions   lisinopril 20 MG Tabs  Start taking on:  9/26/2017  What changed:  · medication strength  · how much to take  Commonly known as:  PRINIVIL   Take 1 Tab by mouth every day.  Dose:  20 mg     metformin 1000 MG tablet  What changed:  · how much to take  · when to take this  · reasons to take this  Commonly known as:  GLUCOPHAGE   Take 0.5 Tabs by mouth 2 times daily with meals as needed (ONLY take, if your home glucose levels are consistently over 150.).  Dose:  500 mg     oxcarbazepine 150 MG Tabs  What changed:  · medication strength  · how much to take  Commonly known as:  TRILEPTAL   Take 3 Tabs by mouth 2 times a day.  Dose:  450 mg        CONTINUE taking these medications      Instructions   albuterol 108 (90 Base) MCG/ACT Aers inhalation aerosol   Inhale 1 Puff by mouth every 6 hours as needed.  Dose:  1 Puff     aspirin EC 81 MG Tbec  Commonly known as:  ECOTRIN   Take 81 mg by mouth every day.  Dose:  81 mg     atorvastatin 20 MG Tabs  Commonly known as:  LIPITOR   Take 20 mg by mouth every evening.  Dose:  20 mg     MULTI FOR HIM 50+ PO   Take 1 Tab by mouth every day.  Dose:  1 Tab     OMEGA 3 PO   Take 1 Tab by mouth every day.  Dose:  1 Tab            Disposition:   Discharge home, with close follow-up with his PCP and psychiatrist.     Diet:   regular    Activity:   As tolerated    Instructions:      The patient was instructed to return to the ER in the event of worsening symptoms. I  have counseled the patient on the importance of compliance and the patient has agreed to proceed with all medical recommendations and follow up plan indicated above.   The patient understands that all medications come with benefits and risks. Risks may include permanent injury or death and these risks can be minimized with close reassessment and monitoring.        He has been instructed on reducing the dosage of his lisinopril and Trileptal (and try to avoid using Seroquel, if it is just for sleep) until he follows up with his other physicians.      Primary Care Provider:    Chino Pettit  Discharge summary faxed to primary care provider:  to be sent electronically  Copy of discharge summary given to the patient: Deferred      Follow up appointment details :      Patient states he is scheduled to follow-up with his PCP tomorrow.    He states he has an appointment with his psychiatrist in just over a week.      Pending Studies:        None.     Summary of follow up issues:   Needs to follow up with PCP and Psychiatrist (as described above) to further evaluate the possibility of medication causes for the low sodium.  Also, to further evaluate for possible polydipsia and his future sodium level after limiting his fluid intake to about 8 glasses of water (fluids) per day.      His PCP should also monitor his glucose levels and ensure that he receives the appropriate medication.  He may also wish to follow up on his history of weight loss over the past year (as he provided mixed accounts of his eating habits, while here).      Time spent on discharge day patient visit, preparing discharge paperwork and arranging for patient follow up.  Discharge Time (Minutes) :    45        Condition on Discharge  - Good, but requiring follow-up with his other physicians.   Patient improved faster than previously anticipated.      ______________________________________________________________________    Interval history/exam for day of  "discharge:    Pleasant and cooperative male who appears concerned about his health, but in no acute distress or panic.  He states he feels much better than yesterday.  He noted that after walking to the hospital yesterday, he became a bit \"blurry\" mentally, and felt light-headed (without any vertigo).  He also notes some anxiety over his job, and stress at work.        Vitals:    09/25/17 0510 09/25/17 0800 09/25/17 1230 09/25/17 1500   BP:  135/76 126/83 128/83   Pulse: 66 91 84 99   Resp: 18 16 18 18   Temp:  36.6 °C (97.8 °F) 36.7 °C (98 °F) 36.9 °C (98.4 °F)   SpO2: 96% 93% 94% 96%   Weight:       Height:         Weight/BMI: Body mass index is 31.74 kg/m².  Pulse Oximetry: 94 %, O2 (LPM): 0, O2 Delivery: None (Room Air)      Physical Exam   Constitutional:  oriented to person, place, and time.  Pleasant.  Cooperative.   Head: Normocephalic and atraumatic.   Eyes: EOM are normal. Pupils are equal, round, and reactive to light.   Cardiovascular: Normal rate, regular rhythm and normal heart sounds.   No murmur heard.  Pulmonary/Chest: Effort normal and breath sounds normal. No respiratory distress. He has no wheezes. He has no rales.   Abdominal: Soft. Bowel sounds are normal. He exhibits no distension. There is no tenderness.   Musculoskeletal: He exhibits no edema. Good general movement and strength in all four extremities.   Neurological: He is alert and oriented to person, place, and time. He has normal reflexes. No cranial nerve deficit.   Psychological:  He appears concerned about his health, but still very pleasant and cooperative.         Most Recent Labs:    Lab Results   Component Value Date/Time    WBC 9.5 09/24/2017 10:58 PM    WBC 9.7 06/02/2011 09:06 AM    RBC 5.45 09/24/2017 10:58 PM    RBC 4.99 06/02/2011 09:06 AM    HEMOGLOBIN 16.2 09/24/2017 10:58 PM    HEMATOCRIT 45.8 09/24/2017 10:58 PM    MCV 84.0 09/24/2017 10:58 PM    MCV 90 06/02/2011 09:06 AM    MCH 29.7 09/24/2017 10:58 PM    [facility] 29.5 " 06/02/2011 09:06 AM    MCHC 35.4 (H) 09/24/2017 10:58 PM    MPV 10.3 09/24/2017 10:58 PM    NEUTSPOLYS 55.60 09/24/2017 10:58 PM    LYMPHOCYTES 29.50 09/24/2017 10:58 PM    MONOCYTES 9.40 09/24/2017 10:58 PM    EOSINOPHILS 3.50 09/24/2017 10:58 PM    BASOPHILS 1.40 09/24/2017 10:58 PM    HYPOCHROMIA 1+ 06/16/2009 03:50 AM      Lab Results   Component Value Date/Time    SODIUM 134 (L) 09/25/2017 12:07 PM    POTASSIUM 4.5 09/25/2017 12:07 PM    CHLORIDE 101 09/25/2017 12:07 PM    CO2 26 09/25/2017 12:07 PM    GLUCOSE 100 (H) 09/25/2017 12:07 PM    BUN 12 09/25/2017 12:07 PM    CREATININE 0.64 09/25/2017 12:07 PM    CREATININE 0.81 06/02/2011 09:06 AM    BUNCREATRAT 20 06/02/2011 09:06 AM      Lab Results   Component Value Date/Time    ALTSGPT 34 09/24/2017 10:58 PM    ASTSGOT 25 09/24/2017 10:58 PM    ALKPHOSPHAT 71 09/24/2017 10:58 PM    TBILIRUBIN 0.5 09/24/2017 10:58 PM    LIPASE 12 01/24/2015 05:55 PM    ALBUMIN 4.4 09/24/2017 10:58 PM    GLOBULIN 3.0 09/24/2017 10:58 PM    INR 0.95 06/16/2009 03:50 AM     Lab Results   Component Value Date/Time    PROTHROMBTM 11.0 06/16/2009 03:50 AM    INR 0.95 06/16/2009 03:50 AM

## 2017-09-25 NOTE — ED PROVIDER NOTES
"ER Provider Note     Scribed for Kevin Garcia M.D. by Jose Burger. 9/24/2017, 11:52 PM.    Primary Care Provider: OZZIE Dennis  Means of Arrival: Walk-in   History obtained from: Patient  History limited by: None     CHIEF COMPLAINT  Chief Complaint   Patient presents with   • Shortness of Breath     onset today   • Dizziness     seen at [facility] and hospitalized for same along w/ hypoglycemic, released on 18th.    • Malaise     \"I feel weak all over\"        HPI  El Duran III is a 51 y.o. male who presents to the Emergency Department complaining of weakness onset today while walking. His knees reportedly gave out because of his weakness prompting him to come here this evening. He states that he was drifting back and forth across the sidewalk on the way here. Patient was seen and admitted at [facility] for similar symptoms in addition to vomiting and nausea on 9/17. He was discharged on 9/18 after being given NS fluids to increase his sodium which was at 126 when admitted. He was also told to eat more salt by the admitting physician, but he is still unable to avoid hyponatremia. Patient states he recently had his Levemir dosage decreased by half for his diabetes mellitus management because he lost weight. During his [facility] stay it was recommended that he stop using his Levemir and began using Glipizide. Patient did not receive his Levemir treatments while admitted to [facility]. The patient reports associate blurred vision, shortness of breath generalized muscle soreness, headache, dark urine, and dizziness. His headaches onset two days ago. The headache is of a pounding or \"rubber band around my head\" quality and similar in quality to a stress headache. He states that his work is somewhat stressful. He tried to manage the headache with Ibuprofen with no relief. He denies any fever, vomiting, or diarrhea. Patient occasionally uses Snus tobacco. He denies smoking cigarettes or using " drugs.    REVIEW OF SYSTEMS  Pertinent positives include weakness, altered gait, blurred vision, shortness of breath generalized muscle soreness, headache, dark urine, and dizziness. Pertinent negatives include no fever, vomiting, or diarrhea. See HPI for further details. All other systems are negative.   C    PAST MEDICAL HISTORY   has a past medical history of ASTHMA; Depression; Hypertension; and IV drug user.    SURGICAL HISTORY  patient denies any surgical history    SOCIAL HISTORY  Social History   Substance Use Topics   • Smoking status: Never Smoker   • Smokeless tobacco: Never Used      Comment: 2 cigars a day   • Alcohol use No      Comment: DENIES      History   Drug Use No     Comment: history of meth use - years ago       FAMILY HISTORY  Family History   Problem Relation Age of Onset   • Heart Disease Father      MI at 59   • Diabetes Maternal Grandmother    • Hypertension Maternal Grandmother    • Cancer Paternal Grandfather      brain tumor        CURRENT MEDICATIONS  No current facility-administered medications on file prior to encounter.      Current Outpatient Prescriptions on File Prior to Encounter   Medication Sig Dispense Refill   • lisinopril (PRINIVIL, ZESTRIL) 40 MG tablet Take 40 mg by mouth every day.     • ATORVASTATIN CALCIUM PO Take  by mouth.     • metformin (GLUCOPHAGE) 500 MG Tab Take 1,000 mg by mouth 2 times a day.     • insulin detemir (LEVEMIR) 100 UNIT/ML Solution Inject 40 Units as instructed every evening.     • oxcarbazepine (TRILEPTAL) 300 MG Tab Take 900 mg by mouth 2 times a day.     • ferrous gluconate (FERGON) 324 (38 FE) MG Tab Take 324 mg by mouth every morning with breakfast.     • acetaminophen/caffeine/butalbital 300-40-50 mg (FIORICET) -40 MG Cap capsule Take 1 Cap by mouth every four hours as needed for Headache. 12 Cap 0   • albuterol (VENTOLIN OR PROVENTIL) 108 (90 BASE) MCG/ACT AERS Inhale 1 Puff by mouth every 6 hours as needed.     • albuterol  "(PROVENTIL) 2.5mg/0.5ml NEBU 2.5 mg by Nebulization route every four hours as needed.         ALLERGIES  Allergies   Allergen Reactions   • Benadryl [Diphenhydramine] Anxiety   • Codeine Hives       PHYSICAL EXAM  VITAL SIGNS: /96   Pulse 98   Temp 36.1 °C (96.9 °F)   Resp 16   Ht 1.753 m (5' 9\")   Wt 97.7 kg (215 lb 6.2 oz)   SpO2 98%   BMI 31.81 kg/m²      Constitutional: Alert in no apparent distress. Somewhat slow to respond  HENT: No signs of trauma, Bilateral external ears normal, Nose normal.   Eyes: Pupils are equal and reactive, Conjunctiva normal, Non-icteric.   Neck: Normal range of motion, No tenderness, Supple, No stridor.   Lymphatic: No lymphadenopathy noted.   Cardiovascular: Regular rate and rhythm, no murmurs.   Thorax & Lungs: Normal breath sounds, No respiratory distress, No wheezing, No chest tenderness.   Abdomen: Bowel sounds normal, Soft, No tenderness, No masses, No pulsatile masses. No peritoneal signs.  Skin: Warm, Dry, No erythema, No rash.   Back: No bony tenderness, No CVA tenderness.   Extremities: Intact distal pulses, No lower extremity edema, No tenderness, No cyanosis. Moving all extremities equally  Musculoskeletal: Good range of motion in all major joints. No tenderness to palpation or major deformities noted.   Neurologic: Alert , Full strength and sensation in all extremities, No focal deficits noted. Normal cranial nerves II-XII, No ataxia upon moving extremities  Psychiatric: Flat affect Judgment normal, Mood normal.     DIAGNOSTIC STUDIES / PROCEDURES    LABS  Results for orders placed or performed during the hospital encounter of 09/24/17   BTYPE NATRIURETIC PEPTIDE   Result Value Ref Range    B Natriuretic Peptide 2 0 - 100 pg/mL   CBC WITH DIFFERENTIAL   Result Value Ref Range    WBC 9.5 4.8 - 10.8 K/uL    RBC 5.45 4.70 - 6.10 M/uL    Hemoglobin 16.2 14.0 - 18.0 g/dL    Hematocrit 45.8 42.0 - 52.0 %    MCV 84.0 81.4 - 97.8 fL    MCH 29.7 27.0 - 33.0 pg    " MCHC 35.4 (H) 33.7 - 35.3 g/dL    RDW 38.6 35.9 - 50.0 fL    Platelet Count 246 164 - 446 K/uL    MPV 10.3 9.0 - 12.9 fL    Neutrophils-Polys 55.60 44.00 - 72.00 %    Lymphocytes 29.50 22.00 - 41.00 %    Monocytes 9.40 0.00 - 13.40 %    Eosinophils 3.50 0.00 - 6.90 %    Basophils 1.40 0.00 - 1.80 %    Immature Granulocytes 0.60 0.00 - 0.90 %    Nucleated RBC 0.00 /100 WBC    Neutrophils (Absolute) 5.27 1.82 - 7.42 K/uL    Lymphs (Absolute) 2.80 1.00 - 4.80 K/uL    Monos (Absolute) 0.89 (H) 0.00 - 0.85 K/uL    Eos (Absolute) 0.33 0.00 - 0.51 K/uL    Baso (Absolute) 0.13 (H) 0.00 - 0.12 K/uL    Immature Granulocytes (abs) 0.06 0.00 - 0.11 K/uL    NRBC (Absolute) 0.00 K/uL   COMP METABOLIC PANEL   Result Value Ref Range    Sodium 128 (L) 135 - 145 mmol/L    Potassium 4.3 3.6 - 5.5 mmol/L    Chloride 96 96 - 112 mmol/L    Co2 23 20 - 33 mmol/L    Anion Gap 9.0 0.0 - 11.9    Glucose 110 (H) 65 - 99 mg/dL    Bun 10 8 - 22 mg/dL    Creatinine 0.57 0.50 - 1.40 mg/dL    Calcium 9.8 8.5 - 10.5 mg/dL    AST(SGOT) 25 12 - 45 U/L    ALT(SGPT) 34 2 - 50 U/L    Alkaline Phosphatase 71 30 - 99 U/L    Total Bilirubin 0.5 0.1 - 1.5 mg/dL    Albumin 4.4 3.2 - 4.9 g/dL    Total Protein 7.4 6.0 - 8.2 g/dL    Globulin 3.0 1.9 - 3.5 g/dL    A-G Ratio 1.5 g/dL   ESTIMATED GFR   Result Value Ref Range    GFR If African American >60 >60 mL/min/1.73 m 2    GFR If Non African American >60 >60 mL/min/1.73 m 2   URINE DRUG SCREEN   Result Value Ref Range    Amphetamines Urine Negative Negative    Barbiturates Negative Negative    Benzodiazepines Negative Negative    Cocaine Metabolite Negative Negative    Methadone Negative Negative    Opiates Negative Negative    Oxycodone Negative Negative    Phencyclidine -Pcp Negative Negative    Propoxyphene Negative Negative    Cannabinoid Metab Negative Negative   ACCU-CHEK GLUCOSE   Result Value Ref Range    Glucose - Accu-Ck 107 (H) 65 - 99 mg/dL     All labs reviewed by  me.    RADIOLOGY  DX-CHEST-LIMITED (1 VIEW)   Final Result         No significant interval change. Trace right pleural effusion/thickening.      CT-HEAD W/O    (Results Pending)      The radiologist's interpretation of all radiological studies have been reviewed by me.    COURSE & MEDICAL DECISION MAKING  Pertinent Labs & Imaging studies reviewed. (See chart for details)    This is a 51 y.o. male that presents Generalized malaise and recent history of hyponatremia and admission for this. The patient has a nonfocal neurologic exam. I will obtain an EKG as well as BNP CBC and CMP looking for the cause for the patient's weakness. The patient does not have any urinary symptoms I see no need to check a urinalysis. I done his weakness is a stroke given his neurologic exam. My impression is this is likely metabolic.     11:52 PM - Patient seen and examined at bedside. Patient will be medicated with DX chest, estimated GFR, BNP, Blood culture, CBC with differential, CMP, and accu-check glucose for his symptoms.     12:26  AM I ordered NS infusion 1 L because the patient is hyponatremic.    12:32 AM I ordered Reglan injection 10 mg.    12:50 AM Paged [facility].     12:58 AM I discussed the patient's case and the above findings with [facility] who agree to admit the patient.    Patient found to have a hyponatremia of 128. In addition the patient is complaining of mild headache and nausea and Reglan was given. The patient appears to have some mild EPS symptoms and Ativan was given for this.  I will give the patient fluids given his hyponatremia.  Plan to admit to internal medicine for his hyponatremia and for continued workup. His liver function tests do not show hypoalbuminemia as a cause or liver failure. His renal function also is within normal limits so the hyponatremia is unlikely due to renal dysfunction.    DISPOSITION:  Patient will be admitted to [facility] in [facility]  condition.    FINAL IMPRESSION  1. Hyponatremia    2. Weakness          Jose LYLES (Scribe), am scribing for, and in the presence of, Kevin Garcia M.D..    Electronically signed by: Jose Burger (Scribe), 9/24/2017    Kevin LYLES M.D. personally performed the services described in this documentation, as scribed by Jose Burger in my presence, and it is both accurate and complete.     The note accurately reflects work and decisions made by me.  Kevin Garcia  9/25/2017  2:25 AM

## 2017-09-25 NOTE — SENIOR ADMIT NOTE
Senior Admit Note    El LANDEROS Renee III 51 y.o. male with PMHx of DM and HTN presented to the hospital with complaint of generalized weakness. He expressed that it started 2 weeks ago suddenly and he started feeling week with headache. He reported that he was admitted to the [facility] on 09/17 and discharged on next day with complaint of hyponatremia. He felt better after he discharged from there but for the last 2 days he started feeling weakness and headache. His headache didn't respond to ibuprofen. He was told by doctor that his sodium was low and he has been taking extra salt for that. He also reported that he drinks 1 gallon of water daily. He denies any SI/HI.         ED Course:    Patient treated with metoclopramide and after that he started shaking could be possible side effect of metoclopramide (EPS). He was treated with benzo and after that he started feeling better.           Physical Exam:    General: Not in acute distress  HEENT: NCAT  Resp: Clear to auscultation B/L with no wheeze  CVS: Regular rate and rhythm   Abdomen: Soft non tender +BS  Neuro: No focal deficit, CN II-XII grossly intact    Assessment and plan:    Patient has mild hyponatremia in the setting of excessive salt and water intake. Ordered urine lytes and urine osm. Will do free fluid restriction 1L/day. Patient received IVF in ED  Patient has new onset headache and he does not have any focal neurological deficit. Ordered CT head. Patient reported that he underwent U/S carotid and echocardiogram and [facility] at his recent admission. Day team to obtain medical record from [facility] if required.  Will continue his home medication except diabetes medication. He does not use insulin as per patient his primary care doctor told him that he does not need insulin.   Hold Oxcarbazepine as hyponatremia is known adverse effect. Day team to evaluate for continuation of his therapy.      Code Status : Full    DVT Prophylaxis:  Lovenox

## 2017-09-26 ENCOUNTER — PATIENT OUTREACH (OUTPATIENT)
Dept: HEALTH INFORMATION MANAGEMENT | Facility: OTHER | Age: 52
End: 2017-09-26

## 2017-09-26 NOTE — PROGRESS NOTES
Discharge order written, reviewing follow ups, home meds, discharge instructions. Tele removed, IV removed. Pt being discharged home to follow up with outpatient psychiatric. Competing discharge now.

## 2017-09-26 NOTE — DISCHARGE INSTRUCTIONS
Discharge Instructions    Discharged to home by car with self. Discharged via wheelchair, hospital escort: Refused.  Special equipment needed: Not Applicable    Be sure to schedule a follow-up appointment with your primary care doctor or any specialists as instructed.     Discharge Plan:   Diet Plan: Discussed  Activity Level: Discussed  Confirmed Follow up Appointment: Appointment Scheduled  Confirmed Symptoms Management: Discussed  Medication Reconciliation Updated: Yes  Influenza Vaccine Indication: Indicated: 9 to 64 years of age  Influenza Vaccine Given - only chart on this line when given: Influenza Vaccine Given (See MAR)    I understand that a diet low in cholesterol, fat, and sodium is recommended for good health. Unless I have been given specific instructions below for another diet, I accept this instruction as my diet prescription.   Other diet: Regular diabetic    Special Instructions: None    · Is patient discharged on Warfarin / Coumadin?   No     · Is patient Post Blood Transfusion?  No      Hyponatremia  Hyponatremia is when the amount of salt (sodium) in your blood is too low. When sodium levels are low, your cells absorb extra water and they swell. The swelling happens throughout the body, but it mostly affects the brain.  CAUSES  This condition may be caused by:  · Heart, kidney, or liver problems.  · Thyroid problems.  · Adrenal gland problems.  · Metabolic conditions, such as syndrome of inappropriate antidiuretic hormone (SIADH).  · Severe vomiting and diarrhea.  · Certain medicines or illegal drugs.  · Dehydration.  · Drinking too much water.  · Eating a diet that is low in sodium.  · Large burns on your body.  · Sweating.  RISK FACTORS  This condition is more likely to develop in people who:  · Have long-term (chronic) kidney disease.  · Have heart failure.  · Have a medical condition that causes frequent or excessive diarrhea.  · Have metabolic conditions, such as Trail disease or  SIADH.  · Take certain medicines that affect the sodium and fluid balance in the blood. Some of these medicine types include:  ¨ Diuretics.  ¨ NSAIDs.  ¨ Some opioid pain medicines.  ¨ Some antidepressants.  ¨ Some seizure prevention medicines.  SYMPTOMS   Symptoms of this condition include:  · Nausea and vomiting.  · Confusion.  · Lethargy.  · Agitation.  · Headache.  · Seizures.  · Unconsciousness.  · Appetite loss.  · Muscle weakness and cramping.  · Feeling weak or light-headed.  · Having a rapid heart rate.  · Fainting, in severe cases.  DIAGNOSIS  This condition is diagnosed with a medical history and physical exam. You will also have other tests, including:  · Blood tests.  · Urine tests.  TREATMENT  Treatment for this condition depends on the cause. Treatment may include:  · Fluids given through an IV tube that is inserted into one of your veins.  · Medicines to correct the sodium imbalance. If medicines are causing the condition, the medicines will need to be adjusted.  · Limiting water or fluid intake to get the correct sodium balance.  HOME CARE INSTRUCTIONS  · Take medicines only as directed by your health care provider. Many medicines can make this condition worse. Talk with your health care provider about any medicines that you are currently taking.  · Carefully follow a recommended diet as directed by your health care provider.  · Carefully follow instructions from your health care provider about fluid restrictions.  · Keep all follow-up visits as directed by your health care provider. This is important.  · Do not drink alcohol.  SEEK MEDICAL CARE IF:  · You develop worsening nausea, fatigue, headache, confusion, or weakness.  · Your symptoms go away and then return.  · You have problems following the recommended diet.  SEEK IMMEDIATE MEDICAL CARE IF:  · You have a seizure.  · You faint.  · You have ongoing diarrhea or vomiting.     This information is not intended to replace advice given to you by  your health care provider. Make sure you discuss any questions you have with your health care provider.     Document Released: 12/08/2003 Document Revised: 05/03/2016 Document Reviewed: 01/07/2016  [employer] Interactive Patient Education ©2016 [employer]        Acetaminophen; Butalbital; Caffeine tablets or capsules  What is this medicine?  ACETAMINOPHEN; BUTALBITAL; CAFFEINE (a set a AKILAH star fen; byoo WEST bi west; KAF een) is a pain reliever. It is used to treat tension headaches.  This medicine may be used for other purposes; ask your health care provider or pharmacist if you have questions.  COMMON BRAND NAME(S): Alagesic, Americet, Anolor-300, Arcet , KAMARI , CAPACET, Dolgic Plus, Esgic Plus, Esgic, Ezol, Fioricet, Geone, Margesic, Medigesic, Orbivan , Pacaps , Repan, Tenake , Triad, Zebutal  What should I tell my health care provider before I take this medicine?  They need to know if you have any of these conditions:  -drink more than 3 alcohol-containing drinks per day  -drug abuse or addiction  -heart or circulation problems  -kidney disease or problems going to the bathroom  -liver disease  -lung disease, asthma, or breathing problems  -porphyria  -an unusual or allergic reaction to acetaminophen, butalbital or other barbiturates, caffeine, other medicines, foods, dyes, or preservatives  -pregnant or trying to get pregnant  -breast-feeding  How should I use this medicine?  Take this medicine by mouth with a full glass of water. Follow the directions on the prescription label. If the medicine upsets your stomach, take the medicine with food or milk. Do not take more than you are told to take.  Talk to your pediatrician regarding the use of this medicine in children. Special care may be needed.  Overdosage: If you think you have taken too much of this medicine contact a poison control center or emergency room at once.  NOTE: This medicine is only for you. Do not share this medicine with others.  What if I miss  a dose?  If you miss a dose, take it as soon as you can. If it is almost time for your next dose, take only that dose. Do not take double or extra doses.  What may interact with this medicine?  -alcohol or medicines that contain alcohol  -antidepressants, especially MAOIs like isocarboxazid, phenelzine, tranylcypromine, and selegiline  -antihistamines  -benzodiazepines  -carbamazepine  -isoniazid  -medicines for pain like pentazocine, buprenorphine, butorphanol, nalbuphine, tramadol, and propoxyphene  -muscle relaxants  -naltrexone  -phenobarbital, phenytoin, and fosphenytoin  -phenothiazines like perphenazine, thioridazine, chlorpromazine, mesoridazine, fluphenazine, prochlorperazine, promazine, and trifluoperazine  -voriconazole  This list may not describe all possible interactions. Give your health care provider a list of all the medicines, herbs, non-prescription drugs, or dietary supplements you use. Also tell them if you smoke, drink alcohol, or use illegal drugs. Some items may interact with your medicine.  What should I watch for while using this medicine?  Tell your doctor or health care professional if your pain does not go away, if it gets worse, or if you have new or a different type of pain. You may develop tolerance to the medicine. Tolerance means that you will need a higher dose of the medicine for pain relief. Tolerance is normal and is expected if you take the medicine for a long time.  Do not suddenly stop taking your medicine because you may develop a severe reaction. Your body becomes used to the medicine. This does NOT mean you are addicted. Addiction is a behavior related to getting and using a drug for a non-medical reason. If you have pain, you have a medical reason to take pain medicine. Your doctor will tell you how much medicine to take. If your doctor wants you to stop the medicine, the dose will be slowly lowered over time to avoid any side effects.  You may get drowsy or dizzy when you  first start taking the medicine or change doses. Do not drive, use machinery, or do anything that may be dangerous until you know how the medicine affects you. Stand or sit up slowly.  Do not take other medicines that contain acetaminophen with this medicine. Always read labels carefully. If you have questions, ask your doctor or pharmacist.  If you take too much acetaminophen get medical help right away. Too much acetaminophen can be very dangerous and cause liver damage. Even if you do not have symptoms, it is important to get help right away.  What side effects may I notice from receiving this medicine?  Side effects that you should report to your doctor or health care professional as soon as possible:  -allergic reactions like skin rash, itching or hives, swelling of the face, lips, or tongue  -breathing problems  -confusion  -feeling faint or lightheaded, falls  -redness, blistering, peeling or loosening of the skin, including inside the mouth  -seizure  -stomach pain  -yellowing of the eyes or skin  Side effects that usually do not require medical attention (report to your doctor or health care professional if they continue or are bothersome):  -constipation  -nausea, vomiting  This list may not describe all possible side effects. Call your doctor for medical advice about side effects. You may report side effects to FDA at 2-172-FDA-7084.  Where should I keep my medicine?  Keep out of the reach of children. This medicine can be abused. Keep your medicine in a safe place to protect it from theft. Do not share this medicine with anyone. Selling or giving away this medicine is dangerous and against the law.  Store at room temperature between 15 and 30 degrees C (59 and 86 degrees F). Keep container tightly closed. Protect from light. Throw away any unused medicine after the expiration date.  NOTE: This sheet is a summary. It may not cover all possible information. If you have questions about this medicine, talk  to your doctor, pharmacist, or health care provider.  © 2014, Elsevier/Gold Standard. (11/24/2010 10:47:11 AM)        Oxcarbazepine tablets  What is this medicine?  OXCARBAZEPINE (ox car BAZ e peen) is used to treat people with epilepsy. It helps prevent partial seizures.  This medicine may be used for other purposes; ask your health care provider or pharmacist if you have questions.  COMMON BRAND NAME(S): Trileptal  What should I tell my health care provider before I take this medicine?  They need to know if you have any of these conditions:  -kidney disease  -liver disease  -suicidal thoughts, plans, or attempt; a previous suicide attempt by you or a family member  -any unusual or allergic reaction to oxcarbazepine, carbamazepine, other medicines, foods, dyes, or preservatives  -pregnant or trying to get pregnant  -breast-feeding  How should I use this medicine?  Take this medicine by mouth with a glass of water. Follow the directions on the prescription label. This medicine may be taken with or without food. Take your doses at regular intervals. Do not take your medicine more often than directed. Do not stop taking except on the advice of your doctor or health care professional.  A special MedGuide will be given to you by the pharmacist with each prescription and refill. Be sure to read this information carefully each time.  Talk to your pediatrician regarding the use of this medicine in children. While this medicine may be prescribed for children as young as 2 years for selected conditions, precautions do apply.  Overdosage: If you think you have taken too much of this medicine contact a poison control center or emergency room at once.  NOTE: This medicine is only for you. Do not share this medicine with others.  What if I miss a dose?  If you miss a dose, take it as soon as you can. If it is almost time for your next dose, take only that dose. Do not take double or extra doses.  What may interact with this  medicine?  Do not take this medicine with any of the following medications:  -carbamazepine  This medicine may also interact with the following medications:  -birth control pills  -certain medicines for seizures like phenobarbital, phenytoin, valproic acid  -certain medicines for high blood pressure like felodipine, diltiazem, verapamil  -cyclosporine  This list may not describe all possible interactions. Give your health care provider a list of all the medicines, herbs, non-prescription drugs, or dietary supplements you use. Also tell them if you smoke, drink alcohol, or use illegal drugs. Some items may interact with your medicine.  What should I watch for while using this medicine?  Visit your doctor or health care professional for regular checks on your progress. Do not stop taking this medicine suddenly. This increases the risk of seizures. Wear a Medic Alert bracelet or necklace. Carry an identification card with information about your condition, medications, and doctor or health care professional.  Rarely, serious skin allergic reactions may occur with this medicine. If you develop a skin rash, redness, itching, peeling skin inside your mouth, swollen glands, or a fever while taking this medicine, contact your health care provider immediately.  You may get drowsy or dizzy. Do not drive, use machinery, or do anything that needs mental alertness until you know how this drug affects you. Do not stand or sit up quickly, especially if you are an older patient. This reduces the risk of dizzy or fainting spells. Alcohol can make you more drowsy and dizzy. Avoid alcoholic drinks.  Birth control pills may not work properly while you are taking this medicine. Talk to your doctor about using an extra method of birth control.  The use of this medicine may increase the chance of suicidal thoughts or actions. Pay special attention to how you are responding while on this medicine. Any worsening of mood, or thoughts of  suicide or dying should be reported to your health care professional right away.  Women who become pregnant while using this medicine may enroll in the [place] Antiepileptic Drug Pregnancy Registry by calling 1-624.242.8320. This registry collects information about the safety of antiepileptic drug use during pregnancy.  What side effects may I notice from receiving this medicine?  Side effects that you should report to your doctor or health care professional as soon as possible:  -allergic reactions such as skin rash or itching, hives, swelling of the lips, mouth, tongue, or throat  -changes in vision  -confusion  -difficulty passing urine or change in the amount of urine  -fever  -infection  -nausea, vomiting  -problems with balance, speaking, walking  -swelling of feet, hands  -unusual bleeding, bruising  -unusually weak or tired  -worsening of mood, thoughts or actions of suicide or dying  -yellowing of eyes, skin  Side effects that usually do not require medical attention (report to your doctor or health care professional if they continue or are bothersome):  -constipation or diarrhea  -headache  -loss of appetite  -nervous  -stomach upset  -tremors  -trouble sleeping  This list may not describe all possible side effects. Call your doctor for medical advice about side effects. You may report side effects to FDA at 1-846-FDA-8451.  Where should I keep my medicine?  Keep out of reach of children.  Store at room temperature between 15 and 30 degrees C (59 and 86 degrees F). Keep container tightly closed. Throw away any unused medicine after the expiration date.  NOTE: This sheet is a summary. It may not cover all possible information. If you have questions about this medicine, talk to your doctor, pharmacist, or health care provider.  © 2014, [employer]/Gold Standard. (1/3/2013 10:54:09 AM)        Depression / Suicide Risk    As you are discharged from this [facility], it is important to learn how  to keep safe from harming yourself.    Recognize the warning signs:  · Abrupt changes in personality, positive or negative- including increase in energy   · Giving away possessions  · Change in eating patterns- significant weight changes-  positive or negative  · Change in sleeping patterns- unable to sleep or sleeping all the time   · Unwillingness or inability to communicate  · Depression  · Unusual sadness, discouragement and loneliness  · Talk of wanting to die  · Neglect of personal appearance   · Rebelliousness- reckless behavior  · Withdrawal from people/activities they love  · Confusion- inability to concentrate     If you or a loved one observes any of these behaviors or has concerns about self-harm, here's what you can do:  · Talk about it- your feelings and reasons for harming yourself  · Remove any means that you might use to hurt yourself (examples: pills, rope, extension cords, firearm)  · Get professional help from the community (Mental Health, Substance Abuse, psychological counseling)  · Do not be alone:Call your Safe Contact- someone whom you trust who will be there for you.  · Call your local CRISIS HOTLINE 163-9339 or 323-299-9081  · Call your local Children's Mobile Crisis Response Team [place] (846) 214-3851 or www.Airpowered  · Call the toll free National Suicide Prevention Hotlines   · National Suicide Prevention Lifeline 283-864-PLMA (7647)  · National Hope Line Network 800-SUICIDE (199-5796)

## 2017-09-30 LAB
BACTERIA BLD CULT: NORMAL
BACTERIA BLD CULT: NORMAL
SIGNIFICANT IND 70042: NORMAL
SIGNIFICANT IND 70042: NORMAL
SITE SITE: NORMAL
SITE SITE: NORMAL
SOURCE SOURCE: NORMAL
SOURCE SOURCE: NORMAL

## 2018-04-04 ENCOUNTER — APPOINTMENT (OUTPATIENT)
Dept: RADIOLOGY | Facility: MEDICAL CENTER | Age: 53
End: 2018-04-04
Attending: EMERGENCY MEDICINE
Payer: MEDICAID

## 2018-04-04 ENCOUNTER — HOSPITAL ENCOUNTER (EMERGENCY)
Facility: MEDICAL CENTER | Age: 53
End: 2018-04-05
Attending: EMERGENCY MEDICINE
Payer: MEDICAID

## 2018-04-04 DIAGNOSIS — R10.9 ACUTE RIGHT FLANK PAIN: ICD-10-CM

## 2018-04-04 LAB
ALBUMIN SERPL BCP-MCNC: 4.2 G/DL (ref 3.2–4.9)
ALBUMIN/GLOB SERPL: 1.6 G/DL
ALP SERPL-CCNC: 67 U/L (ref 30–99)
ALT SERPL-CCNC: 21 U/L (ref 2–50)
ANION GAP SERPL CALC-SCNC: 8 MMOL/L (ref 0–11.9)
APPEARANCE UR: CLEAR
AST SERPL-CCNC: 22 U/L (ref 12–45)
BASOPHILS # BLD AUTO: 1 % (ref 0–1.8)
BASOPHILS # BLD: 0.08 K/UL (ref 0–0.12)
BILIRUB SERPL-MCNC: 0.6 MG/DL (ref 0.1–1.5)
BILIRUB UR QL STRIP.AUTO: NEGATIVE
BUN SERPL-MCNC: 17 MG/DL (ref 8–22)
CALCIUM SERPL-MCNC: 9.1 MG/DL (ref 8.5–10.5)
CHLORIDE SERPL-SCNC: 105 MMOL/L (ref 96–112)
CO2 SERPL-SCNC: 24 MMOL/L (ref 20–33)
COLOR UR: YELLOW
CREAT SERPL-MCNC: 0.85 MG/DL (ref 0.5–1.4)
CULTURE IF INDICATED INDCX: NO UA CULTURE
EOSINOPHIL # BLD AUTO: 0.24 K/UL (ref 0–0.51)
EOSINOPHIL NFR BLD: 3 % (ref 0–6.9)
ERYTHROCYTE [DISTWIDTH] IN BLOOD BY AUTOMATED COUNT: 38.2 FL (ref 35.9–50)
GLOBULIN SER CALC-MCNC: 2.7 G/DL (ref 1.9–3.5)
GLUCOSE SERPL-MCNC: 100 MG/DL (ref 65–99)
GLUCOSE UR STRIP.AUTO-MCNC: NEGATIVE MG/DL
HCT VFR BLD AUTO: 44.2 % (ref 42–52)
HGB BLD-MCNC: 15.6 G/DL (ref 14–18)
IMM GRANULOCYTES # BLD AUTO: 0.02 K/UL (ref 0–0.11)
IMM GRANULOCYTES NFR BLD AUTO: 0.2 % (ref 0–0.9)
KETONES UR STRIP.AUTO-MCNC: NEGATIVE MG/DL
LEUKOCYTE ESTERASE UR QL STRIP.AUTO: NEGATIVE
LIPASE SERPL-CCNC: 16 U/L (ref 11–82)
LYMPHOCYTES # BLD AUTO: 2.5 K/UL (ref 1–4.8)
LYMPHOCYTES NFR BLD: 30.9 % (ref 22–41)
MCH RBC QN AUTO: 30.4 PG (ref 27–33)
MCHC RBC AUTO-ENTMCNC: 35.3 G/DL (ref 33.7–35.3)
MCV RBC AUTO: 86 FL (ref 81.4–97.8)
MICRO URNS: NORMAL
MONOCYTES # BLD AUTO: 0.8 K/UL (ref 0–0.85)
MONOCYTES NFR BLD AUTO: 9.9 % (ref 0–13.4)
NEUTROPHILS # BLD AUTO: 4.46 K/UL (ref 1.82–7.42)
NEUTROPHILS NFR BLD: 55 % (ref 44–72)
NITRITE UR QL STRIP.AUTO: NEGATIVE
NRBC # BLD AUTO: 0 K/UL
NRBC BLD-RTO: 0 /100 WBC
PH UR STRIP.AUTO: 7.5 [PH]
PLATELET # BLD AUTO: 199 K/UL (ref 164–446)
PMV BLD AUTO: 10.9 FL (ref 9–12.9)
POTASSIUM SERPL-SCNC: 4.2 MMOL/L (ref 3.6–5.5)
PROT SERPL-MCNC: 6.9 G/DL (ref 6–8.2)
PROT UR QL STRIP: NEGATIVE MG/DL
RBC # BLD AUTO: 5.14 M/UL (ref 4.7–6.1)
RBC UR QL AUTO: NEGATIVE
SODIUM SERPL-SCNC: 137 MMOL/L (ref 135–145)
SP GR UR STRIP.AUTO: 1.01
UROBILINOGEN UR STRIP.AUTO-MCNC: 0.2 MG/DL
WBC # BLD AUTO: 8.1 K/UL (ref 4.8–10.8)

## 2018-04-04 PROCEDURE — 36415 COLL VENOUS BLD VENIPUNCTURE: CPT

## 2018-04-04 PROCEDURE — 85025 COMPLETE CBC W/AUTO DIFF WBC: CPT

## 2018-04-04 PROCEDURE — 83690 ASSAY OF LIPASE: CPT

## 2018-04-04 PROCEDURE — 99284 EMERGENCY DEPT VISIT MOD MDM: CPT

## 2018-04-04 PROCEDURE — 80053 COMPREHEN METABOLIC PANEL: CPT

## 2018-04-04 PROCEDURE — 96374 THER/PROPH/DIAG INJ IV PUSH: CPT

## 2018-04-04 PROCEDURE — 81003 URINALYSIS AUTO W/O SCOPE: CPT

## 2018-04-04 RX ORDER — KETOROLAC TROMETHAMINE 30 MG/ML
15 INJECTION, SOLUTION INTRAMUSCULAR; INTRAVENOUS ONCE
Status: COMPLETED | OUTPATIENT
Start: 2018-04-05 | End: 2018-04-05

## 2018-04-04 ASSESSMENT — PAIN SCALES - GENERAL: PAINLEVEL_OUTOF10: 8

## 2018-04-05 VITALS
DIASTOLIC BLOOD PRESSURE: 91 MMHG | HEART RATE: 75 BPM | TEMPERATURE: 96.8 F | SYSTOLIC BLOOD PRESSURE: 154 MMHG | BODY MASS INDEX: 32.39 KG/M2 | HEIGHT: 69 IN | OXYGEN SATURATION: 92 % | RESPIRATION RATE: 16 BRPM | WEIGHT: 218.7 LBS

## 2018-04-05 PROCEDURE — 700111 HCHG RX REV CODE 636 W/ 250 OVERRIDE (IP): Performed by: EMERGENCY MEDICINE

## 2018-04-05 PROCEDURE — 700117 HCHG RX CONTRAST REV CODE 255: Performed by: EMERGENCY MEDICINE

## 2018-04-05 PROCEDURE — 74177 CT ABD & PELVIS W/CONTRAST: CPT

## 2018-04-05 RX ADMIN — KETOROLAC TROMETHAMINE 15 MG: 30 INJECTION, SOLUTION INTRAMUSCULAR at 00:15

## 2018-04-05 RX ADMIN — IOHEXOL 100 ML: 350 INJECTION, SOLUTION INTRAVENOUS at 00:24

## 2018-04-05 ASSESSMENT — PAIN SCALES - GENERAL: PAINLEVEL_OUTOF10: 5

## 2018-04-05 NOTE — ED PROVIDER NOTES
ED Provider Note    CHIEF COMPLAINT  Chief Complaint   Patient presents with   • Flank Pain     *three days.  R sided, radiating into front.         HPI    Primary care provider: OZZIE Kerr   History obtained from: Patient  History limited by: None     El LANDEROS Renee III is a 52 y.o. male who presents to the ED complaining of moderate to severe right back/flank pain for 3 days with slight radiation to the front. The pain has been progressively worsening. He denies any known injury/trauma/inciting event. Denies history of similar pain. The pain has been constant and achy without improvement with over-the-counter pain medicine. He noted that the pain is worse when he tries to get up from sitting position or walking. He denies fever/nausea/vomiting/diarrhea/constipation/dysuria/hematuria.      REVIEW OF SYSTEMS  Please see HPI for pertinent positives/negatives.  All other systems reviewed and are negative.     PAST MEDICAL HISTORY  Past Medical History:   Diagnosis Date   • ASTHMA     seasonal asthma   • Depression    • Hypertension    • Hyponatremia    • IV drug user         SURGICAL HISTORY  No past surgical history on file.     SOCIAL HISTORY  Social History     Social History Main Topics   • Smoking status: Never Smoker   • Smokeless tobacco: Never Used   • Alcohol use No      Comment: DENIES   • Drug use: No      Comment: history of meth use - several years ago   • Sexual activity: Yes     Partners: Female      Comment:  6 years         FAMILY HISTORY  Family History   Problem Relation Age of Onset   • Heart Disease Father      MI at 59   • Diabetes Maternal Grandmother    • Hypertension Maternal Grandmother    • Cancer Paternal Grandfather      brain tumor         CURRENT MEDICATIONS  Home Medications    **Home medications have not yet been reviewed for this encounter**          ALLERGIES  Allergies   Allergen Reactions   • Benadryl [Diphenhydramine] Anxiety   • Codeine Hives        PHYSICAL  "EXAM  VITAL SIGNS: /91   Pulse 75   Temp 36 °C (96.8 °F)   Resp 16   Ht 1.753 m (5' 9\")   Wt 99.2 kg (218 lb 11.1 oz)   SpO2 92%   BMI 32.30 kg/m²  @UGO[[id number]::@     Pulse ox interpretation: 97% I interpret this pulse ox as normal     Constitutional: Well developed, well nourished, alert in no apparent distress, nontoxic appearance    HENT: No external signs of trauma, normocephalic, oropharynx moist and clear, nose normal   Eyes: PERRL, conjunctiva without erythema, no discharge, no icterus    Neck: Soft and supple, trachea midline, no stridor, no tenderness, no LAD, no JVD, good ROM    Cardiovascular: Regular rate and rhythm, no murmurs/rubs/gallops, strong distal pulses and good perfusion    Thorax & Lungs: No respiratory distress, CTAB   Abdomen: Soft, mild right sided tenderness to palpation, nondistended, no guarding, no rebound, normal BS    Back: Right CVAT, straight leg raising negative bilaterally, 5/5 strength equal bilateral lower extremities, sensation intact to touch throughout, DTRs 1/4 equal bilateral lower extremities  Extremities: No cyanosis, no edema, no gross deformity, good ROM, no tenderness, intact distal pulses with brisk cap refill    Skin: Warm, dry, no pallor/cyanosis, no rash noted    Lymphatic: No lymphadenopathy noted    Neuro: A/O times 3, no focal deficits noted, normal gait  Psychiatric: Cooperative, normal mood and affect, normal judgement, appropriate for clinical situation          DIAGNOSTIC STUDIES / PROCEDURES        LABS  All labs reviewed by me.     Results for orders placed or performed during the hospital encounter of 04/04/18   CBC WITH DIFFERENTIAL   Result Value Ref Range    WBC 8.1 4.8 - 10.8 K/uL    RBC 5.14 4.70 - 6.10 M/uL    Hemoglobin 15.6 14.0 - 18.0 g/dL    Hematocrit 44.2 42.0 - 52.0 %    MCV 86.0 81.4 - 97.8 fL    MCH 30.4 27.0 - 33.0 pg    MCHC 35.3 33.7 - 35.3 g/dL    RDW 38.2 35.9 - 50.0 fL    Platelet Count 199 164 - 446 K/uL    MPV 10.9 9.0 " - 12.9 fL    Neutrophils-Polys 55.00 44.00 - 72.00 %    Lymphocytes 30.90 22.00 - 41.00 %    Monocytes 9.90 0.00 - 13.40 %    Eosinophils 3.00 0.00 - 6.90 %    Basophils 1.00 0.00 - 1.80 %    Immature Granulocytes 0.20 0.00 - 0.90 %    Nucleated RBC 0.00 /100 WBC    Neutrophils (Absolute) 4.46 1.82 - 7.42 K/uL    Lymphs (Absolute) 2.50 1.00 - 4.80 K/uL    Monos (Absolute) 0.80 0.00 - 0.85 K/uL    Eos (Absolute) 0.24 0.00 - 0.51 K/uL    Baso (Absolute) 0.08 0.00 - 0.12 K/uL    Immature Granulocytes (abs) 0.02 0.00 - 0.11 K/uL    NRBC (Absolute) 0.00 K/uL   COMP METABOLIC PANEL   Result Value Ref Range    Sodium 137 135 - 145 mmol/L    Potassium 4.2 3.6 - 5.5 mmol/L    Chloride 105 96 - 112 mmol/L    Co2 24 20 - 33 mmol/L    Anion Gap 8.0 0.0 - 11.9    Glucose 100 (H) 65 - 99 mg/dL    Bun 17 8 - 22 mg/dL    Creatinine 0.85 0.50 - 1.40 mg/dL    Calcium 9.1 8.5 - 10.5 mg/dL    AST(SGOT) 22 12 - 45 U/L    ALT(SGPT) 21 2 - 50 U/L    Alkaline Phosphatase 67 30 - 99 U/L    Total Bilirubin 0.6 0.1 - 1.5 mg/dL    Albumin 4.2 3.2 - 4.9 g/dL    Total Protein 6.9 6.0 - 8.2 g/dL    Globulin 2.7 1.9 - 3.5 g/dL    A-G Ratio 1.6 g/dL   LIPASE   Result Value Ref Range    Lipase 16 11 - 82 U/L   URINALYSIS,CULTURE IF INDICATED   Result Value Ref Range    Color Yellow     Character Clear     Specific Gravity 1.012 <1.035    Ph 7.5 5.0 - 8.0    Glucose Negative Negative mg/dL    Ketones Negative Negative mg/dL    Protein Negative Negative mg/dL    Bilirubin Negative Negative    Urobilinogen, Urine 0.2 Negative    Nitrite Negative Negative    Leukocyte Esterase Negative Negative    Occult Blood Negative Negative    Micro Urine Req see below     Culture Indicated No UA Culture   ESTIMATED GFR   Result Value Ref Range    GFR If African American >60 >60 mL/min/1.73 m 2    GFR If Non African American >60 >60 mL/min/1.73 m 2        RADIOLOGY  The radiologist's interpretation of all radiological studies have been reviewed by me.      CT-ABDOMEN-PELVIS WITH   Final Result         1.  No acute abnormality.   2.  Hepatomegaly             COURSE & MEDICAL DECISION MAKING  Nursing notes, VS, PMSFHx reviewed in chart.     Review of past medical records shows the patient was admitted September 2017 for likely psychogenic polydipsia, hypertension and bipolar disorder.    Differential diagnoses considered include but are not limited to: Appendicitis, AAA, bowel cholecystitis/ascending cholangitis, gallstone/biliary colic, diverticulitis, mesenteric ischemia, pancreatitis, KS/renal colic, UTI/pyelo, colitis, muscle strain, hernia      Patient presents to the ED with above complaint. Labs were essentially unremarkable. CT abdomen/pelvis without significant abnormality except for hepatomegaly. Patient was given Toradol for his pain without significant improvement. Findings discussed with the patient. This is a pleasant well-appearing nontoxic patient with likely musculoskeletal back pain based on the history/exam/findings. Patient states that he has been using over-the-counter pain medicine as well as a prescribed muscle relaxant along with heating pad without significant improvement. Discussed with patient that he can continue to use his current regimen. He was advised to follow up with his primary care provider for further evaluation and was given return to ED precautions. Patient also noted to have elevated blood pressure and will need outpatient follow-up for further management. Patient verbalized understanding and agreed with plan of care with no further questions or concerns.      The patient is referred to a primary physician for blood pressure management, diabetic screening, and for all other preventative health concerns.       FINAL IMPRESSION  1. Acute right flank pain           DISPOSITION  Patient will be discharged home in stable condition.       FOLLOW UP  PHILL Kerr.  [id number]  [place] [address]  [phone]    Call  today      [facility], Emergency Dept  [address] [facility] [facility] [address]  [phone]    If symptoms worsen         OUTPATIENT MEDICATIONS  Discharge Medication List as of 4/5/2018  1:44 AM             Electronically signed by: Horace Pruett, 4/4/2018 11:11 PM      Portions of this record were made with voice recognition software.  Despite my review, spelling/grammar/context errors may still remain.  Interpretation of this chart should be taken in this context.

## 2018-04-05 NOTE — ED TRIAGE NOTES
"  Chief Complaint   Patient presents with   • Flank Pain     *three days.  R sided, radiating into front.      Ambulatory to triage for above, appears uncomfortable.  Denies N/V/Fevers at home.  Patient states that the pain in not moving at this time and describes it as a stabbing pain.    Explained wait time and triage process to pt. Pt placed back out in lobby, told to notify ED tech or triage RN of any changes, verbalized understanding.    /91   Pulse 89   Temp 36 °C (96.8 °F)   Resp 16   Ht 1.753 m (5' 9\")   Wt 99.2 kg (218 lb 11.1 oz)   SpO2 97%   BMI 32.30 kg/m²         "

## 2018-04-05 NOTE — ED NOTES
Pt given discharge instructions, reviewed, had no questions for RN. Verbalizes understanding of follow up and self care at home. VS stable. PIV dc'd. Ambulatory to ED exit, steady on feet.

## 2018-04-06 ENCOUNTER — PATIENT OUTREACH (OUTPATIENT)
Dept: HEALTH INFORMATION MANAGEMENT | Facility: OTHER | Age: 53
End: 2018-04-06

## 2019-04-02 ENCOUNTER — APPOINTMENT (OUTPATIENT)
Dept: BEHAVIORAL HEALTH | Facility: CLINIC | Age: 54
End: 2019-04-02
Payer: COMMERCIAL

## 2019-05-06 ENCOUNTER — OFFICE VISIT (OUTPATIENT)
Dept: BEHAVIORAL HEALTH | Facility: CLINIC | Age: 54
End: 2019-05-06
Payer: COMMERCIAL

## 2019-05-06 DIAGNOSIS — F31.81 BIPOLAR 2 DISORDER, MAJOR DEPRESSIVE EPISODE (HCC): ICD-10-CM

## 2019-05-06 DIAGNOSIS — F40.10 SOCIAL ANXIETY DISORDER: ICD-10-CM

## 2019-05-06 PROCEDURE — 90791 PSYCH DIAGNOSTIC EVALUATION: CPT | Performed by: PSYCHOLOGIST

## 2019-05-06 RX ORDER — QUETIAPINE FUMARATE 25 MG/1
50 TABLET, FILM COATED ORAL
COMMUNITY
End: 2020-09-28

## 2019-05-06 RX ORDER — QUETIAPINE FUMARATE 25 MG/1
25 TABLET, FILM COATED ORAL
COMMUNITY
End: 2020-09-28
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
## 2019-06-06 ENCOUNTER — OFFICE VISIT (OUTPATIENT)
Dept: BEHAVIORAL HEALTH | Facility: CLINIC | Age: 54
End: 2019-06-06
Payer: COMMERCIAL

## 2019-06-06 DIAGNOSIS — F31.81 BIPOLAR 2 DISORDER, MAJOR DEPRESSIVE EPISODE (HCC): ICD-10-CM

## 2019-06-06 DIAGNOSIS — F40.10 SOCIAL ANXIETY DISORDER: ICD-10-CM

## 2019-06-06 DIAGNOSIS — F15.21 AMPHETAMINE DEPENDENCE IN EARLY, EARLY PARTIAL, SUSTAINED FULL, OR SUSTAINED PARTIAL REMISSION (HCC): ICD-10-CM

## 2019-06-06 PROCEDURE — 90834 PSYTX W PT 45 MINUTES: CPT | Performed by: PSYCHOLOGIST

## 2019-06-07 NOTE — BH THERAPY
[facility]  Therapy Progress Note    Patient Name: El LANDEROS Renee III  Patient MRN: [id number]  Today's Date: 6/6/2019     Type of session:Individual psychotherapy  Length of session: 45 minutes  Persons in attendance:Patient    Subjective/New Info: The patient ID/Chief Complaint:  The patient is a 53 year old male, , .  The patient self-referred and voluntarily presented for an individual intake to address a history of bipolar 2 with social anxiety.  Reviewed limits of confidentiality and [facility] policies; patient expressed understanding and agreed to voluntarily proceed with evaluation and treatment     Interval History:   Session Focus: The patient's estimated global assessment of functioning suggests a moderate degree of emotional distress and difficulty dealing with relationships and work.  Patient reports over the course of the last 2 weeks he has had a number of struggles with potential triggers for relapse for the use of methamphetamines.  The patient was able to follow through with his relapse prevention plan.  When using problem solving with the patient identified a few other potential future triggers including his continued possession of syringes that he previously was prescribed when he was using insulin to treat his diabetes.  The patient was encouraged to discard those syringes with waste management which provides a free service for individuals.  Patient was also encouraged to return to NA meetings and increase his contact with his sponsor which he already has initiated.    Therapeutic Intervention: Cognitive Behavioral Therapy      Planned Intervention: Continue to use problem solving to address anxiety and substance abuse concerns in order to support the patient's sobriety.      Objective/Observations:    Patient did not present in acute distress. Patient was appropriately groomed. Patient was alert and oriented x4. Eye contact was appropriate. No  abnormalities in attention or concentration were noted. No abnormalities of movement present; psychomotor activity was normal. Speech was fluent and regular in rhythm, rate, volume, and tone. Thought processes linear, logical and goal directed. There was no evidence of thought disorder. No auditory or visual hallucinations. Long and short term memory appeared to be intact. Insight, judgment, and impulse control were deemed to be fair.  Reported mood was “concerned.” Affect was full-ranging and appropriate to thought content and conversation.  Patient denied current suicidal and homicidal ideation in plan, intent, and preparatory behavior.    Psychometric Test Results:       BHM-20  Global Mental Health  Within Normal Limits  Well Being Scale  Mild Distress  Symptoms Scale  Within Normal Limits  Anxiety Subscale  Within Normal Limits  Depression Subscale  Within Normal Limits  Alcohol/Drug Subscale Within Normal Limits  Bipolar Subscale  Within Normal Limits  Eating Disorder Subscale Within Normal Limits  Harm to other Subscale Within Normal Limits  Suicide Monitoring Scale No Indication of Risk  Life Functioning Scale   Mild Distress        Diagnoses:   1. Bipolar 2 disorder, major depressive episode (HCC)    2. Social anxiety disorder    3. 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    Risk Level: Not Currently at Clinically Significant Risk  Hospitalization is not deemed necessary at this time as the patient does not present a clear or imminent danger to self or others. No indication for pursuing higher level of care. Outpatient management is currently most appropriate and least restrictive level of care.    Current risk:   SUICIDE: Low   Homicide: Not  applicable   Self-harm: Low   Relapse: Moderate   Other:    Safety Plan reviewed? No   If evidence of imminent risk is present, intervention/plan:         Treatment Goal(s)/Objective(s) addressed:   Goal: Reduce symptoms of hypomania and/or paula  Explore feelings and thoughts about self, his or her own abilities, and future plans.  Accomplish mood stability, having slower reaction with anger, less expansive, and being more socially appropriate and sensitive.  Encourage expression of grief, fear, and anger regarding real or imagined losses in life.  Differ between real and imagined losses, rejections, and abandonment.  Stop or reduce self destructive behaviors such as promiscuity, substance abuse, and the expression of overt hostility or aggression.  Learn to speak more slowly and be more subject oriented.  Learn to dress and groom in a less attention grabbing manner.  Express the acceptance of dependency needs.  Identify and list positive traits and behaviors that help build genuine self esteem.  Lower grandiose statements and learn to express self more realistically.    Progress toward Treatment Goals: Mild improvement    Plan:    1) The patient will return to the clinic 2-3 weeks.  2) Crisis Response Plan:  Reviewed emergency resources with the patient and the patient expressed understanding including:  If feeling suicidal, patient will call or present to the [facility] during duty hours or present to closest ED ([facility] or [facility], call [phone] or crisis hotline ([phone]-DVNJ) after duty hours.  3) Referrals/Consults:  N/A  4) Barriers to Learning:  No  5) Readiness to Learn:  Yes  6) Cultural Concerns:  No  7) Patient voiced understanding of, and agreement with, plan and goals as annotated above.  8) Declare these services are medically necessary and appropriate to the patient’s diagnosis and needs  9) The point of contact at the Behavioral Health  Clinic regarding this evaluation is Dr. Garay, Psychologist.    Dean Garay III, Ed.D.  6/6/2019

## 2019-06-19 ENCOUNTER — APPOINTMENT (OUTPATIENT)
Dept: BEHAVIORAL HEALTH | Facility: CLINIC | Age: 54
End: 2019-06-19
Payer: COMMERCIAL

## 2019-07-09 ENCOUNTER — OFFICE VISIT (OUTPATIENT)
Dept: BEHAVIORAL HEALTH | Facility: CLINIC | Age: 54
End: 2019-07-09
Payer: COMMERCIAL

## 2019-07-09 DIAGNOSIS — F15.21 AMPHETAMINE DEPENDENCE IN EARLY, EARLY PARTIAL, SUSTAINED FULL, OR SUSTAINED PARTIAL REMISSION (HCC): ICD-10-CM

## 2019-07-09 DIAGNOSIS — F31.9 BIPOLAR AFFECTIVE DISORDER, REMISSION STATUS UNSPECIFIED (HCC): ICD-10-CM

## 2019-07-09 PROCEDURE — 90834 PSYTX W PT 45 MINUTES: CPT | Performed by: PSYCHOLOGIST

## 2019-07-10 NOTE — BH THERAPY
[facility]  Therapy Progress Note    Patient Name: El LANDEROS Renee III  Patient MRN: [id number]  Today's Date: 7/9/2019     Type of session:Individual psychotherapy  Length of session: 45 minutes  Persons in attendance:Patient    Subjective/New Info: The patient ID/Chief Complaint:  The patient is a 53 year old male, , .  The patient self-referred and voluntarily presented for an individual intake to address a history of bipolar 2 with social anxiety.  Reviewed limits of confidentiality and [facility] policies; patient expressed understanding and agreed to voluntarily proceed with evaluation and treatment     Interval History:   Session Focus: The patient's estimated global assessment of functioning suggests a mild degree of emotional distress and difficulty dealing with relationships and work.  The patient reports that he was able to dispose of 2 boxes of syringes he reported some relief related to them being potential triggers for relapse.  Patient continues to report some mild anxieties and particularly in the morning reviewed his medications and encouraged the patient to take his medications as prescribed.  Patient was also educated about the half-life of propranolol because this may have been 1 of the difficulties he was experiencing he was taking the propranolol about 20 minutes before getting on the bus which probably was not that effective he did report they typically only takes 1 propranolol per day he was encouraged to take the propranolol as prescribed equally divided every 12 hours.    Therapeutic Intervention: Cognitive Behavioral Therapy      Planned Intervention: Continue to use problem solving to address anxiety and substance abuse concerns in order to support the patient's sobriety.      Objective/Observations:    Patient did not present in acute distress. Patient was appropriately groomed. Patient was alert and oriented x4. Eye contact was appropriate.  No abnormalities in attention or concentration were noted. No abnormalities of movement present; psychomotor activity was normal. Speech was fluent and regular in rhythm, rate, volume, and tone. Thought processes linear, logical and goal directed. There was no evidence of thought disorder. No auditory or visual hallucinations. Long and short term memory appeared to be intact. Insight, judgment, and impulse control were deemed to be fair.  Reported mood was “euthymic.” Affect was full-ranging and appropriate to thought content and conversation.  Patient denied current suicidal and homicidal ideation in plan, intent, and preparatory behavior.        Diagnoses:   1. Bipolar affective disorder, remission status unspecified (HCC)    2. 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    Risk Level: Not Currently at Clinically Significant Risk  Hospitalization is not deemed necessary at this time as the patient does not present a clear or imminent danger to self or others. No indication for pursuing higher level of care. Outpatient management is currently most appropriate and least restrictive level of care.    Current risk:   SUICIDE: Low   Homicide: Not applicable   Self-harm: Low   Relapse: Moderate   Other:    Safety Plan reviewed? No   If evidence of imminent risk is present, intervention/plan:         Treatment Goal(s)/Objective(s) addressed:   Goal: Reduce symptoms of hypomania and/or paula  Explore feelings and thoughts about self, his or her own abilities, and future plans.  Accomplish mood stability, having slower reaction with anger, less expansive, and being more socially appropriate and sensitive.  Encourage expression of grief, fear, and anger regarding  real or imagined losses in life.  Differ between real and imagined losses, rejections, and abandonment.  Stop or reduce self destructive behaviors such as promiscuity, substance abuse, and the expression of overt hostility or aggression.  Learn to speak more slowly and be more subject oriented.  Learn to dress and groom in a less attention grabbing manner.  Express the acceptance of dependency needs.  Identify and list positive traits and behaviors that help build genuine self esteem.  Lower grandiose statements and learn to express self more realistically.    Progress toward Treatment Goals: Mild improvement    Plan:    1) The patient will return to the clinic 4-5 weeks.  2) Crisis Response Plan:  Reviewed emergency resources with the patient and the patient expressed understanding including:  If feeling suicidal, patient will call or present to the [facility] during duty hours or present to closest ED ([facility] or [facility], call 911 or crisis hotline ([phone]) after duty hours.  3) Referrals/Consults:  N/A  4) Barriers to Learning:  No  5) Readiness to Learn:  Yes  6) Cultural Concerns:  No  7) Patient voiced understanding of, and agreement with, plan and goals as annotated above.  8) Declare these services are medically necessary and appropriate to the patient’s diagnosis and needs  9) The point of contact at the [facility] regarding this evaluation is Dr. Garay, Psychologist.    Dean Garay III, Ed.D.  7/9/2019

## 2019-08-26 ENCOUNTER — APPOINTMENT (OUTPATIENT)
Dept: BEHAVIORAL HEALTH | Facility: CLINIC | Age: 54
End: 2019-08-26
Payer: COMMERCIAL

## 2019-09-09 ENCOUNTER — APPOINTMENT (OUTPATIENT)
Dept: BEHAVIORAL HEALTH | Facility: CLINIC | Age: 54
End: 2019-09-09
Payer: COMMERCIAL

## 2019-10-18 ENCOUNTER — DOCUMENTATION (OUTPATIENT)
Dept: BEHAVIORAL HEALTH | Facility: CLINIC | Age: 54
End: 2019-10-18

## 2020-09-28 ENCOUNTER — HOSPITAL ENCOUNTER (OUTPATIENT)
Facility: MEDICAL CENTER | Age: 55
End: 2020-09-29
Attending: EMERGENCY MEDICINE | Admitting: HOSPITALIST
Payer: COMMERCIAL

## 2020-09-28 ENCOUNTER — APPOINTMENT (OUTPATIENT)
Dept: RADIOLOGY | Facility: IMAGING CENTER | Age: 55
End: 2020-09-28
Attending: PHYSICIAN ASSISTANT
Payer: COMMERCIAL

## 2020-09-28 ENCOUNTER — APPOINTMENT (OUTPATIENT)
Dept: RADIOLOGY | Facility: MEDICAL CENTER | Age: 55
End: 2020-09-28
Payer: COMMERCIAL

## 2020-09-28 ENCOUNTER — OFFICE VISIT (OUTPATIENT)
Dept: URGENT CARE | Facility: PHYSICIAN GROUP | Age: 55
End: 2020-09-28
Payer: COMMERCIAL

## 2020-09-28 VITALS
RESPIRATION RATE: 16 BRPM | BODY MASS INDEX: 29.33 KG/M2 | TEMPERATURE: 98.9 F | DIASTOLIC BLOOD PRESSURE: 86 MMHG | OXYGEN SATURATION: 98 % | HEART RATE: 74 BPM | HEIGHT: 69 IN | SYSTOLIC BLOOD PRESSURE: 130 MMHG | WEIGHT: 198 LBS

## 2020-09-28 DIAGNOSIS — R07.9 CHEST PAIN, UNSPECIFIED TYPE: ICD-10-CM

## 2020-09-28 DIAGNOSIS — G56.02 CARPAL TUNNEL SYNDROME OF LEFT WRIST: ICD-10-CM

## 2020-09-28 DIAGNOSIS — R07.9 CHEST PAIN, UNSPECIFIED TYPE: Primary | ICD-10-CM

## 2020-09-28 PROBLEM — Z72.0 TOBACCO ABUSE: Status: ACTIVE | Noted: 2020-09-28

## 2020-09-28 PROBLEM — E78.5 DYSLIPIDEMIA: Status: ACTIVE | Noted: 2020-09-28

## 2020-09-28 PROBLEM — R07.89 OTHER CHEST PAIN: Status: ACTIVE | Noted: 2020-09-28

## 2020-09-28 PROBLEM — E11.65 TYPE 2 DIABETES MELLITUS WITH HYPERGLYCEMIA, WITHOUT LONG-TERM CURRENT USE OF INSULIN (HCC): Status: ACTIVE | Noted: 2020-09-28

## 2020-09-28 LAB
ALBUMIN SERPL BCP-MCNC: 4.4 G/DL (ref 3.2–4.9)
ALBUMIN/GLOB SERPL: 1.9 G/DL
ALP SERPL-CCNC: 70 U/L (ref 30–99)
ALT SERPL-CCNC: 22 U/L (ref 2–50)
ANION GAP SERPL CALC-SCNC: 12 MMOL/L (ref 7–16)
AST SERPL-CCNC: 19 U/L (ref 12–45)
BASOPHILS # BLD AUTO: 0.8 % (ref 0–1.8)
BASOPHILS # BLD: 0.06 K/UL (ref 0–0.12)
BILIRUB SERPL-MCNC: 0.6 MG/DL (ref 0.1–1.5)
BUN SERPL-MCNC: 7 MG/DL (ref 8–22)
CALCIUM SERPL-MCNC: 9.3 MG/DL (ref 8.5–10.5)
CHLORIDE SERPL-SCNC: 99 MMOL/L (ref 96–112)
CO2 SERPL-SCNC: 25 MMOL/L (ref 20–33)
CREAT SERPL-MCNC: 0.45 MG/DL (ref 0.5–1.4)
EKG IMPRESSION: NORMAL
EOSINOPHIL # BLD AUTO: 0.16 K/UL (ref 0–0.51)
EOSINOPHIL NFR BLD: 2.2 % (ref 0–6.9)
ERYTHROCYTE [DISTWIDTH] IN BLOOD BY AUTOMATED COUNT: 39.8 FL (ref 35.9–50)
EST. AVERAGE GLUCOSE BLD GHB EST-MCNC: 177 MG/DL
GLOBULIN SER CALC-MCNC: 2.3 G/DL (ref 1.9–3.5)
GLUCOSE SERPL-MCNC: 215 MG/DL (ref 65–99)
HBA1C MFR BLD: 7.8 % (ref 0–5.6)
HCT VFR BLD AUTO: 43.4 % (ref 42–52)
HGB BLD-MCNC: 15.5 G/DL (ref 14–18)
IMM GRANULOCYTES # BLD AUTO: 0.02 K/UL (ref 0–0.11)
IMM GRANULOCYTES NFR BLD AUTO: 0.3 % (ref 0–0.9)
LYMPHOCYTES # BLD AUTO: 1.91 K/UL (ref 1–4.8)
LYMPHOCYTES NFR BLD: 26.3 % (ref 22–41)
MCH RBC QN AUTO: 31 PG (ref 27–33)
MCHC RBC AUTO-ENTMCNC: 35.7 G/DL (ref 33.7–35.3)
MCV RBC AUTO: 86.8 FL (ref 81.4–97.8)
MONOCYTES # BLD AUTO: 0.62 K/UL (ref 0–0.85)
MONOCYTES NFR BLD AUTO: 8.5 % (ref 0–13.4)
NEUTROPHILS # BLD AUTO: 4.5 K/UL (ref 1.82–7.42)
NEUTROPHILS NFR BLD: 61.9 % (ref 44–72)
NRBC # BLD AUTO: 0 K/UL
NRBC BLD-RTO: 0 /100 WBC
PLATELET # BLD AUTO: 209 K/UL (ref 164–446)
PMV BLD AUTO: 10.9 FL (ref 9–12.9)
POTASSIUM SERPL-SCNC: 3.9 MMOL/L (ref 3.6–5.5)
PROT SERPL-MCNC: 6.7 G/DL (ref 6–8.2)
RBC # BLD AUTO: 5 M/UL (ref 4.7–6.1)
SODIUM SERPL-SCNC: 136 MMOL/L (ref 135–145)
TROPONIN T SERPL-MCNC: 8 NG/L (ref 6–19)
WBC # BLD AUTO: 7.3 K/UL (ref 4.8–10.8)

## 2020-09-28 PROCEDURE — 84484 ASSAY OF TROPONIN QUANT: CPT

## 2020-09-28 PROCEDURE — C9803 HOPD COVID-19 SPEC COLLECT: HCPCS | Performed by: HOSPITALIST

## 2020-09-28 PROCEDURE — 99214 OFFICE O/P EST MOD 30 MIN: CPT | Performed by: PHYSICIAN ASSISTANT

## 2020-09-28 PROCEDURE — G0378 HOSPITAL OBSERVATION PER HR: HCPCS

## 2020-09-28 PROCEDURE — 71046 X-RAY EXAM CHEST 2 VIEWS: CPT | Mod: TC | Performed by: PHYSICIAN ASSISTANT

## 2020-09-28 PROCEDURE — 93005 ELECTROCARDIOGRAM TRACING: CPT | Performed by: EMERGENCY MEDICINE

## 2020-09-28 PROCEDURE — 93000 ELECTROCARDIOGRAM COMPLETE: CPT | Performed by: PHYSICIAN ASSISTANT

## 2020-09-28 PROCEDURE — A9270 NON-COVERED ITEM OR SERVICE: HCPCS | Performed by: EMERGENCY MEDICINE

## 2020-09-28 PROCEDURE — 83036 HEMOGLOBIN GLYCOSYLATED A1C: CPT

## 2020-09-28 PROCEDURE — A9270 NON-COVERED ITEM OR SERVICE: HCPCS | Performed by: HOSPITALIST

## 2020-09-28 PROCEDURE — 700102 HCHG RX REV CODE 250 W/ 637 OVERRIDE(OP): Performed by: EMERGENCY MEDICINE

## 2020-09-28 PROCEDURE — 93005 ELECTROCARDIOGRAM TRACING: CPT

## 2020-09-28 PROCEDURE — 700102 HCHG RX REV CODE 250 W/ 637 OVERRIDE(OP): Performed by: HOSPITALIST

## 2020-09-28 PROCEDURE — 99285 EMERGENCY DEPT VISIT HI MDM: CPT

## 2020-09-28 PROCEDURE — 99220 PR INITIAL OBSERVATION CARE,LEVL III: CPT | Performed by: HOSPITALIST

## 2020-09-28 PROCEDURE — 80053 COMPREHEN METABOLIC PANEL: CPT

## 2020-09-28 PROCEDURE — 85025 COMPLETE CBC W/AUTO DIFF WBC: CPT

## 2020-09-28 PROCEDURE — 36415 COLL VENOUS BLD VENIPUNCTURE: CPT

## 2020-09-28 RX ORDER — HYDROCHLOROTHIAZIDE 12.5 MG/1
12.5 TABLET ORAL DAILY
COMMUNITY

## 2020-09-28 RX ORDER — ACETAMINOPHEN 325 MG/1
650 TABLET ORAL EVERY 6 HOURS PRN
Status: DISCONTINUED | OUTPATIENT
Start: 2020-09-28 | End: 2020-09-29 | Stop reason: HOSPADM

## 2020-09-28 RX ORDER — QUETIAPINE FUMARATE 50 MG/1
50 TABLET, FILM COATED ORAL PRN
COMMUNITY

## 2020-09-28 RX ORDER — PRAMIPEXOLE DIHYDROCHLORIDE 1 MG/1
TABLET ORAL
COMMUNITY
Start: 2020-09-15 | End: 2020-09-28

## 2020-09-28 RX ORDER — PROCHLORPERAZINE EDISYLATE 5 MG/ML
5-10 INJECTION INTRAMUSCULAR; INTRAVENOUS EVERY 4 HOURS PRN
Status: DISCONTINUED | OUTPATIENT
Start: 2020-09-28 | End: 2020-09-29 | Stop reason: HOSPADM

## 2020-09-28 RX ORDER — ESCITALOPRAM OXALATE 10 MG/1
20 TABLET ORAL DAILY
COMMUNITY

## 2020-09-28 RX ORDER — ATORVASTATIN CALCIUM 40 MG/1
40 TABLET, FILM COATED ORAL NIGHTLY
Status: DISCONTINUED | OUTPATIENT
Start: 2020-09-28 | End: 2020-09-29 | Stop reason: HOSPADM

## 2020-09-28 RX ORDER — ATORVASTATIN CALCIUM 40 MG/1
40 TABLET, FILM COATED ORAL NIGHTLY
COMMUNITY

## 2020-09-28 RX ORDER — AMOXICILLIN 250 MG
2 CAPSULE ORAL 2 TIMES DAILY
Status: DISCONTINUED | OUTPATIENT
Start: 2020-09-28 | End: 2020-09-29 | Stop reason: HOSPADM

## 2020-09-28 RX ORDER — HYDROCHLOROTHIAZIDE 12.5 MG/1
TABLET ORAL
COMMUNITY
Start: 2020-09-15 | End: 2020-09-28

## 2020-09-28 RX ORDER — QUETIAPINE FUMARATE 25 MG/1
50 TABLET, FILM COATED ORAL PRN
Status: DISCONTINUED | OUTPATIENT
Start: 2020-09-28 | End: 2020-09-29

## 2020-09-28 RX ORDER — LISINOPRIL 40 MG/1
40 TABLET ORAL DAILY
COMMUNITY

## 2020-09-28 RX ORDER — PROMETHAZINE HYDROCHLORIDE 25 MG/1
12.5-25 TABLET ORAL EVERY 4 HOURS PRN
Status: DISCONTINUED | OUTPATIENT
Start: 2020-09-28 | End: 2020-09-29 | Stop reason: HOSPADM

## 2020-09-28 RX ORDER — PRAMIPEXOLE DIHYDROCHLORIDE 0.25 MG/1
1 TABLET ORAL
Status: DISCONTINUED | OUTPATIENT
Start: 2020-09-28 | End: 2020-09-29 | Stop reason: HOSPADM

## 2020-09-28 RX ORDER — ASPIRIN 325 MG
325 TABLET ORAL ONCE
Status: COMPLETED | OUTPATIENT
Start: 2020-09-28 | End: 2020-09-28

## 2020-09-28 RX ORDER — LISINOPRIL 20 MG/1
40 TABLET ORAL DAILY
Status: DISCONTINUED | OUTPATIENT
Start: 2020-09-29 | End: 2020-09-29 | Stop reason: HOSPADM

## 2020-09-28 RX ORDER — ONDANSETRON 2 MG/ML
4 INJECTION INTRAMUSCULAR; INTRAVENOUS EVERY 4 HOURS PRN
Status: DISCONTINUED | OUTPATIENT
Start: 2020-09-28 | End: 2020-09-29 | Stop reason: HOSPADM

## 2020-09-28 RX ORDER — POLYETHYLENE GLYCOL 3350 17 G/17G
1 POWDER, FOR SOLUTION ORAL
Status: DISCONTINUED | OUTPATIENT
Start: 2020-09-28 | End: 2020-09-29 | Stop reason: HOSPADM

## 2020-09-28 RX ORDER — OXCARBAZEPINE 300 MG/1
1200 TABLET, FILM COATED ORAL
Status: DISCONTINUED | OUTPATIENT
Start: 2020-09-28 | End: 2020-09-29 | Stop reason: HOSPADM

## 2020-09-28 RX ORDER — PRAMIPEXOLE DIHYDROCHLORIDE 1 MG/1
1 TABLET ORAL
COMMUNITY

## 2020-09-28 RX ORDER — NICOTINE 21 MG/24HR
21 PATCH, TRANSDERMAL 24 HOURS TRANSDERMAL
Status: DISCONTINUED | OUTPATIENT
Start: 2020-09-29 | End: 2020-09-29 | Stop reason: HOSPADM

## 2020-09-28 RX ORDER — OXCARBAZEPINE 300 MG/1
1200 TABLET, FILM COATED ORAL
COMMUNITY

## 2020-09-28 RX ORDER — PROMETHAZINE HYDROCHLORIDE 12.5 MG/1
12.5-25 SUPPOSITORY RECTAL EVERY 4 HOURS PRN
Status: DISCONTINUED | OUTPATIENT
Start: 2020-09-28 | End: 2020-09-29 | Stop reason: HOSPADM

## 2020-09-28 RX ORDER — ONDANSETRON 4 MG/1
4 TABLET, ORALLY DISINTEGRATING ORAL EVERY 4 HOURS PRN
Status: DISCONTINUED | OUTPATIENT
Start: 2020-09-28 | End: 2020-09-29 | Stop reason: HOSPADM

## 2020-09-28 RX ORDER — ESCITALOPRAM OXALATE 10 MG/1
20 TABLET ORAL DAILY
Status: DISCONTINUED | OUTPATIENT
Start: 2020-09-29 | End: 2020-09-29 | Stop reason: HOSPADM

## 2020-09-28 RX ORDER — BISACODYL 10 MG
10 SUPPOSITORY, RECTAL RECTAL
Status: DISCONTINUED | OUTPATIENT
Start: 2020-09-28 | End: 2020-09-29 | Stop reason: HOSPADM

## 2020-09-28 RX ORDER — HYDROCHLOROTHIAZIDE 25 MG/1
12.5 TABLET ORAL DAILY
Status: DISCONTINUED | OUTPATIENT
Start: 2020-09-29 | End: 2020-09-29 | Stop reason: HOSPADM

## 2020-09-28 RX ADMIN — PRAMIPEXOLE DIHYDROCHLORIDE 1 MG: 0.5 TABLET ORAL at 21:05

## 2020-09-28 RX ADMIN — OXCARBAZEPINE 1200 MG: 300 TABLET, FILM COATED ORAL at 21:05

## 2020-09-28 RX ADMIN — ATORVASTATIN CALCIUM 40 MG: 40 TABLET, FILM COATED ORAL at 21:05

## 2020-09-28 RX ADMIN — ASPIRIN 325 MG: 325 TABLET, FILM COATED ORAL at 16:46

## 2020-09-28 ASSESSMENT — ENCOUNTER SYMPTOMS
DIARRHEA: 0
DIARRHEA: 0
BACK PAIN: 0
FEVER: 0
SHORTNESS OF BREATH: 0
LOWER EXTREMITY EDEMA: 0
COUGH: 0
SHORTNESS OF BREATH: 0
WEAKNESS: 0
SYNCOPE: 0
NEAR-SYNCOPE: 0
DIZZINESS: 0
EXERTIONAL CHEST PRESSURE: 0
VOMITING: 0
VOMITING: 0
PALPITATIONS: 0
IRREGULAR HEARTBEAT: 0
NAUSEA: 0
ABDOMINAL PAIN: 0
FEVER: 0
CHILLS: 0
NUMBNESS: 1
CHILLS: 0
CONSTIPATION: 0

## 2020-09-28 ASSESSMENT — FIBROSIS 4 INDEX: FIB4 SCORE: 1.09

## 2020-09-28 NOTE — PROGRESS NOTES
Subjective:   El Duran III is a 54 y.o. male who presents for Chest Pain (L side chest pressure, L arm gets tingly, comes and goes x4 weeks )        Chest Pain   This is a new problem. Episode onset: 4 weeks  The onset quality is gradual. The problem occurs intermittently. The problem has been unchanged. The pain is present in the substernal region. The pain is moderate. The quality of the pain is described as numbness and pressure. Radiates to: left arm pain and numbness  Associated symptoms include malaise/fatigue and numbness. Pertinent negatives include no abdominal pain, back pain, cough, dizziness, exertional chest pressure, fever, irregular heartbeat, lower extremity edema, nausea, near-syncope, palpitations, shortness of breath, syncope, vomiting or weakness. Risk factors include stress, male gender and lack of exercise.   His past medical history is significant for diabetes (2010 ) and hypertension (tx with lisinopril 3 years ).   Pertinent negatives for past medical history include no anxiety/panic attacks, no CAD, no hyperlipidemia, no MI and no PE.   His family medical history is significant for CAD, diabetes, heart disease, hypertension and early MI.   Pertinent negatives for family medical history include: no hyperlipidemia.     Review of Systems   Constitutional: Positive for malaise/fatigue. Negative for chills and fever.   Respiratory: Negative for cough and shortness of breath.    Cardiovascular: Positive for chest pain. Negative for palpitations, syncope and near-syncope.   Gastrointestinal: Negative for abdominal pain, constipation, diarrhea, nausea and vomiting.   Musculoskeletal: Negative for back pain.   Neurological: Positive for numbness. Negative for dizziness and weakness.   All other systems reviewed and are negative.      PMH:  has a past medical history of Anxiety, ASTHMA, Bipolar disorder (HCC), Depression, Hypertension, Hyponatremia, IV drug user, Panic disorder, Substance abuse  "(HCC), and Suicide attempt (HCC).  MEDS:   Current Outpatient Medications:   •  hydroCHLOROthiazide (HYDRODIURIL) 12.5 MG tablet, TAKE 1 TABLET BY MOUTH ONCE DAILY FOR BLOOD PRESSURE FOR 90 DAYS, Disp: , Rfl:   •  pramipexole (MIRAPEX) 1 MG Tab, TAKE 1 TABLET BY MOUTH ONCE DAILY FOR 90 DAYS, Disp: , Rfl:   •  QUEtiapine (SEROQUEL) 25 MG Tab, Take 25 mg by mouth 1 time daily as needed., Disp: , Rfl:   •  atorvastatin (LIPITOR) 20 MG Tab, Take 20 mg by mouth every evening., Disp: , Rfl:   •  Omega-3 Fatty Acids (OMEGA 3 PO), Take 1 Tab by mouth every day., Disp: , Rfl:   •  Multiple Vitamins-Minerals (MULTI FOR HIM 50+ PO), Take 1 Tab by mouth every day., Disp: , Rfl:   •  aspirin EC (ECOTRIN) 81 MG Tablet Delayed Response, Take 81 mg by mouth every day., Disp: , Rfl:   •  metformin (GLUCOPHAGE) 1000 MG tablet, Take 0.5 Tabs by mouth 2 times daily with meals as needed (ONLY take, if your home glucose levels are consistently over 150.)., Disp: 30 Tab, Rfl: 0  •  lisinopril (PRINIVIL) 20 MG Tab, Take 1 Tab by mouth every day., Disp: 30 Tab, Rfl: 0  •  albuterol (VENTOLIN OR PROVENTIL) 108 (90 BASE) MCG/ACT AERS, Inhale 1 Puff by mouth every 6 hours as needed., Disp: , Rfl:   ALLERGIES:   Allergies   Allergen Reactions   • Benadryl [Diphenhydramine] Anxiety   • Codeine Hives     SURGHX: History reviewed. No pertinent surgical history.  SOCHX:  reports that he has been smoking. He has never used smokeless tobacco.  Drugs: Methamphetamines and Intravenous.  Family History   Problem Relation Age of Onset   • Heart Disease Father         MI at 59   • Diabetes Maternal Grandmother    • Hypertension Maternal Grandmother    • Cancer Paternal Grandfather         brain tumor    • Suicide Attempts Maternal Uncle    • Suicide Attempts Cousin         Objective:   /86 (BP Location: Right arm, Patient Position: Sitting, BP Cuff Size: Adult)   Pulse 74   Temp 37.2 °C (98.9 °F) (Temporal)   Resp 16   Ht 1.753 m (5' 9\")   Wt " 89.8 kg (198 lb)   SpO2 98%   BMI 29.24 kg/m²     Physical Exam  Vitals signs reviewed.   Constitutional:       General: He is not in acute distress.     Appearance: He is well-developed.   HENT:      Head: Normocephalic and atraumatic.      Right Ear: External ear normal.      Left Ear: External ear normal.      Nose: Nose normal.      Mouth/Throat:      Mouth: Mucous membranes are moist.   Eyes:      Conjunctiva/sclera: Conjunctivae normal.      Pupils: Pupils are equal, round, and reactive to light.   Neck:      Musculoskeletal: Normal range of motion and neck supple.      Trachea: No tracheal deviation.   Cardiovascular:      Rate and Rhythm: Normal rate and regular rhythm.   Pulmonary:      Effort: Pulmonary effort is normal. No respiratory distress.      Breath sounds: Normal breath sounds. No wheezing, rhonchi or rales.   Musculoskeletal:      Right lower leg: No edema.      Left lower leg: No edema.   Skin:     General: Skin is warm and dry.      Capillary Refill: Capillary refill takes less than 2 seconds.   Neurological:      General: No focal deficit present.      Mental Status: He is alert and oriented to person, place, and time.   Psychiatric:         Mood and Affect: Mood normal.         Behavior: Behavior normal.       EKG: Sinus rhythm, borderline LAD.  No significant ST changes.  No significant changes from EKG on 9/25/2017.    CXR IMPRESSION:     Unchanged right pleural effusion or pleural scarring. No acute cardiopulmonary abnormality.      Assessment/Plan:     1. Chest pain, unspecified type  EKG    DX-CHEST-2 VIEWS    REFERRAL TO FOLLOW-UP WITH PRIMARY CARE       The patient is referred to a higher level of care at [facility] now by POV transportation for evaluation and treatment; patient aware of location of [facility]. We discussed risks of non-compliance or delayed medical care. All questions answered to patient’s apparent satisfaction. Patient verbalizes understanding and  agreement with plan of care.       Please note that this dictation was created using voice recognition software. I have made every reasonable attempt to correct obvious errors, but I expect that there are errors of grammar and possibly content that I did not discover before finalizing the note.

## 2020-09-28 NOTE — ED PROVIDER NOTES
"ED Provider Note    Scribed for Claudia Thompson M.D. by Alysha Ndiaye. 9/28/2020  4:10 PM    Primary care provider: None reported  Means of arrival: Walk-in  History obtained from: Patient   History limited by: None    CHIEF COMPLAINT  Chief Complaint   Patient presents with   • Chest Pain     on and off x2 weeks, had \"stabbing\" CP on Saturday morning for a few seconds   • Arm Pain     L arm   • Numbness     LFA and hand       HPI  El Duran III is a 54 y.o. male who presents with complaints of intermittent left-sided chest pain with radiation to the left shoulder and upper arm over the last 1 week.  Patient describes the left-sided chest pain and radiation to the shoulder and left arm as a \"squeezing type sensation.  Patient does not get short of breath, lightheaded, diaphoretic or nauseated with his chest pain.  Pain will last anywhere from 5 to 15 minutes and then resolved.  Patient has history of hypertension, hyperlipidemia, diabetes.  He also chews tobacco and his father dropped out of a heart attack at the age of 59.  Additionally, patient complains of some pain in his left hand and left forearm which occurs at nighttime.  This is been going on for 2 weeks.  It wakes him from sleep.  He has to wake up and shake his arm out.  Sometimes have to get up and walk around the house for couple hours until the pain resolves.  The pain is described as a \"numb and tingling\" sensation that bothers him the most at night and usually resolves during the day. He has difficulty forming a fist while experiencing the pain because his hands and fingers feel swollen but the hand itself does not feel weak. Patient denies any neck pain, shortness of breath, nausea, diaphoresis, chills, vomiting, coughing, diarrhea, or leg swelling. Patient denies history of carpal tunnel or cardiac issues. He has a family history of heart attacks and uses smokeless tobacco. He had a stress test 2 years ago that was normal. Patient has history of " asthma, hyperhypertension, hyperlipidemia, and diabetes.    I verified that the patient was wearing a mask and I was wearing appropriate PPE every time I entered the room. The patient's mask was on the patient at all times during my encounter.      REVIEW OF SYSTEMS  Pertinent positives: Chest pain, left hand tingling/numbness, left upper arm tightness  Pertinent negatives: no neck pain, shortness of breath, nausea, diaphoresis, chills, vomiting, coughing, diarrhea, or leg swelling  See HPI for further details. All other systems are negative.    PAST MEDICAL HISTORY  Past Medical History:   Diagnosis Date   • Anxiety    • ASTHMA     seasonal asthma   • Bipolar disorder (HCC)    • Depression    • Hypertension    • Hyponatremia    • IV drug user    • Panic disorder    • Substance abuse (HCC)    • Suicide attempt (HCC)        FAMILY HISTORY  Family History   Problem Relation Age of Onset   • Heart Disease Father         MI at 59   • Diabetes Maternal Grandmother    • Hypertension Maternal Grandmother    • Cancer Paternal Grandfather         brain tumor    • Suicide Attempts Maternal Uncle    • Suicide Attempts Cousin        SOCIAL HISTORY  Social History     Socioeconomic History   • Marital status:    Tobacco Use   • Smoking status: Light Tobacco Smoker   • Smokeless tobacco: Never Used   Substance and Sexual Activity   • Alcohol use:      Comment: DENIES   • Drug use:      Comment: history of meth use - several years ago   • Sexual activity: Never     Partners: Female     Comment:  6 years        SURGICAL HISTORY  History reviewed. No pertinent surgical history.    CURRENT MEDICATIONS  Home Medications     Reviewed by Rio Meredith (Pharmacy Tech) on 09/28/20 at 1701  Med List Status: Complete   Medication Last Dose Status   albuterol (VENTOLIN OR PROVENTIL) 108 (90 BASE) MCG/ACT AERS 9/26/2020 Active   aspirin EC (ECOTRIN) 81 MG Tablet Delayed Response 9/28/2020 Active   atorvastatin (LIPITOR)  40 MG Tab 9/27/2020 Active   BIOTIN PO 9/28/2020 Active   Cyanocobalamin (B-12 PO) 9/28/2020 Active   escitalopram (LEXAPRO) 10 MG Tab 9/28/2020 Active   Flaxseed, Linseed, (FLAXSEED OIL PO) 9/28/2020 Active   hydroCHLOROthiazide (HYDRODIURIL) 12.5 MG tablet 9/28/2020 Active   lisinopril (PRINIVIL) 40 MG tablet 9/28/2020 Active   metformin (GLUCOPHAGE) 1000 MG tablet 9/28/2020 Active   Multiple Vitamins-Minerals (MULTI FOR HIM 50+ PO) 9/28/2020 Active   Omega-3 Fatty Acids (OMEGA 3 PO) 9/28/2020 Active   OXcarbazepine (TRILEPTAL) 300 MG Tab 9/27/2020 Active   Potassium 99 MG Tab 9/28/2020 Active   pramipexole (MIRAPEX) 1 MG Tab 9/27/2020 Active   quetiapine (SEROQUEL) 50 MG tablet > 2 weeks Active                ALLERGIES  Allergies   Allergen Reactions   • Benadryl [Diphenhydramine] Anxiety   • Codeine Hives       PHYSICAL EXAM  VITAL SIGNS: /82   Pulse 70   Temp 36.3 °C (97.3 °F) (Temporal)   Resp 14   Wt 89.2 kg (196 lb 10.4 oz)   SpO2 93%   BMI 29.04 kg/m²      Constitutional: Well developed, well nourished; No acute distress; Non-toxic appearance.   HENT: Normocephalic, atraumatic; Bilateral external ears normal; oropharynx exam was deferred due to COVID-19 outbreak.  Eyes: PERRL, EOMI, Conjunctiva normal. No discharge.   Neck:  Supple, nontender midline; No stridor; No nuchal rigidity.   Lymphatic: No cervical lymphadenopathy noted.   Cardiovascular: Regular rate and rhythm without murmurs, rubs, or gallop.   Thorax & Lungs: No respiratory distress, breath sounds clear to auscultation bilaterally without wheezing, rales or rhonchi. Nontender chest wall. No crepitus or subcutaneous air  Abdomen: Soft, nontender, bowel sounds normal. No obvious masses; No pulsatile masses; no rebound, guarding, or peritoneal signs.   Skin: Good color; warm and dry without rash or petechia.  Back: Nontender, No CVA tenderness.   Extremities: Distal radial, dorsalis pedis, posterior tibial pulses are equal bilaterally;  No edema; Nontender calves or saphenous, No cyanosis, No clubbing.   Musculoskeletal: Thickening over the flexor retinaculum bilaterally.  No Tinel sign.  There is a positive Phalen sign on the left.  Nontender forearm.  There is some mild tenderness in the elbow.  No pain in the elbow with flexion, extension, pronation or supination.  Neurologic: Alert & oriented x 4, clear speech, upper extremity strengths are 5 out of 5 and equal bilaterally with testing of full , pincer , interossei, wrist extensors, biceps, triceps and deltoids.  Sensation is intact to light touch.      EKG  EKG interpreted by me, see below.     LABS/RADIOLOGY/PROCEDURES  Results for orders placed or performed during the hospital encounter of 09/28/20   CBC with Differential   Result Value Ref Range    WBC 7.3 4.8 - 10.8 K/uL    RBC 5.00 4.70 - 6.10 M/uL    Hemoglobin 15.5 14.0 - 18.0 g/dL    Hematocrit 43.4 42.0 - 52.0 %    MCV 86.8 81.4 - 97.8 fL    MCH 31.0 27.0 - 33.0 pg    MCHC 35.7 (H) 33.7 - 35.3 g/dL    RDW 39.8 35.9 - 50.0 fL    Platelet Count 209 164 - 446 K/uL    MPV 10.9 9.0 - 12.9 fL    Neutrophils-Polys 61.90 44.00 - 72.00 %    Lymphocytes 26.30 22.00 - 41.00 %    Monocytes 8.50 0.00 - 13.40 %    Eosinophils 2.20 0.00 - 6.90 %    Basophils 0.80 0.00 - 1.80 %    Immature Granulocytes 0.30 0.00 - 0.90 %    Nucleated RBC 0.00 /100 WBC    Neutrophils (Absolute) 4.50 1.82 - 7.42 K/uL    Lymphs (Absolute) 1.91 1.00 - 4.80 K/uL    Monos (Absolute) 0.62 0.00 - 0.85 K/uL    Eos (Absolute) 0.16 0.00 - 0.51 K/uL    Baso (Absolute) 0.06 0.00 - 0.12 K/uL    Immature Granulocytes (abs) 0.02 0.00 - 0.11 K/uL    NRBC (Absolute) 0.00 K/uL   Complete Metabolic Panel (CMP)   Result Value Ref Range    Sodium 136 135 - 145 mmol/L    Potassium 3.9 3.6 - 5.5 mmol/L    Chloride 99 96 - 112 mmol/L    Co2 25 20 - 33 mmol/L    Anion Gap 12.0 7.0 - 16.0    Glucose 215 (H) 65 - 99 mg/dL    Bun 7 (L) 8 - 22 mg/dL    Creatinine 0.45 (L) 0.50 - 1.40  mg/dL    Calcium 9.3 8.5 - 10.5 mg/dL    AST(SGOT) 19 12 - 45 U/L    ALT(SGPT) 22 2 - 50 U/L    Alkaline Phosphatase 70 30 - 99 U/L    Total Bilirubin 0.6 0.1 - 1.5 mg/dL    Albumin 4.4 3.2 - 4.9 g/dL    Total Protein 6.7 6.0 - 8.2 g/dL    Globulin 2.3 1.9 - 3.5 g/dL    A-G Ratio 1.9 g/dL   Troponin   Result Value Ref Range    Troponin T 8 6 - 19 ng/L   ESTIMATED GFR   Result Value Ref Range    GFR If African American >60 >60 mL/min/1.73 m 2    GFR If Non African American >60 >60 mL/min/1.73 m 2   HEMOGLOBIN A1C   Result Value Ref Range    Glycohemoglobin 7.8 (H) 0.0 - 5.6 %    Est Avg Glucose 177 mg/dL   TROPONIN   Result Value Ref Range    Troponin T <6 6 - 19 ng/L   Lipid Profile   Result Value Ref Range    Cholesterol,Tot 121 100 - 199 mg/dL    Triglycerides 86 0 - 149 mg/dL    HDL 55 >=40 mg/dL    LDL 49 <100 mg/dL   EKG (NOW)   Result Value Ref Range    Report       [facility] Emergency Dept.    Test Date:  2020  Pt Name:    ROGER FONTAINE                   Department: ER  MRN:        [id number]                      Room:  Gender:     Male                         Technician: [id number]  :        1965                   Requested By:ER TRIAGE PROTOCOL  Order #:    [id number]                    Reading MD: Claudia Thompson    Measurements  Intervals                                Axis  Rate:       72                           P:          62  ND:         160                          QRS:        -11  QRSD:       102                          T:          51  QT:         380  QTc:        416    Interpretive Statements  SINUS RHYTHM rate 72  Normal axis  Normal intervals  No ST elevation  Subtle ST depression V5 and V6 (new)  Compared to ECG 2017 10:13:38  No significant changes  Electronically Signed On 2020 16:11:04 PDT by Claudia Thompson            COURSE & MEDICAL DECISION MAKING  Pertinent Labs & Imaging studies reviewed. (See chart for details)      4:10 PM - Patient seen and examined at  bedside. Discussed plan of care, including that his symptoms may be due to carpal tunnel but that there is concern due his cardiac risk factors. I discussed plan for admission with the patient who agrees to the plan of care. The patient will be medicated with aspirin 325 mg. Ordered for labs to evaluate his symptoms.     5:07 PM - Paged hospitalist.    5:21 PM - I discussed the patient's case and the above findings with Dr. Moreno (Hospitalist) who agreed to accept the patient.      Patient presents to the ER complaining of a chest pain with radiating into the left shoulder and left upper arm for the last 1 week.  The pain comes and goes.  It lasts 5 to 15 minutes at a time.  His last episode of chest pain occurred prior to arrival and lasted 15 minutes.  This was the longest duration he had over the last 1 week since the pain started.  No diaphoresis, shortness of breath, dizziness or lightheadedness or nausea in association with a chest pain.  The patient is very high risk.  He has hypertension, diabetes, hyperlipidemia, strong family history of cardiac disease and that his father  at age 59 of a heart attack.  He also chews tobacco.  Patient's EKG shows some very subtle ST depression in V5 and V6 which is new when compared to 2017.  No ST elevation.  Patient is chest pain-free here in the ER.  He was given an aspirin.  Troponin is negative.  At this time no evidence for STEMI or non-STEMI.  I think the patient warrants cardiac rule out as he is high risk.  Additionally, the patient tells me over the last couple weeks he is had a numb and painful feeling in his left hand and left forearm which occurs at nighttime.  It wakes him from sleep.  He has to shake his arm out.  He has to get up and walk around the house in order for the pain to ultimately go away.  He uses his hands a lot at work.  He has thickening over the flexor retinaculum.  He has a negative Tinel sign but a positive Phalen sign.  I  suspect he has some carpal tunnel.  He has no weakness of his hands or arms.  At this time he does not have any carpal tunnel type symptoms and his strengths are good.  I do not think the problem is coming from his neck.  He has no neck pain.  However this is on the differential.  At this time patient will need to be hospitalized for his chest pain.  He will need cardiac rule out.  With respect to his lower arm discomfort which occurs at nighttime, I suspect he may have carpal tunnel.  He can follow this up as an outpatient as long as his cardiac rule out turns out okay.  No trauma or injury.  Do not think x-rays are necessary are going to be a very helpful.  He understands he can also follow-up with orthopedics to further evaluate for possible carpal tunnel.  At this time he will come into the hospital for his cardiac rule out.  I spoke with the hospitalist on time and she will kindly evaluate the patient hospitalization.      DISPOSITION:  Patient will be hospitalized by Dr. Moreno in guarded condition.      FINAL IMPRESSION  1. Chest pain, unspecified type Acute   2. Carpal tunnel syndrome of left wrist Acute        This dictation has been created using voice recognition software. The accuracy of the dictation is limited by the abilities of the software. I expect there may be some errors of grammar and possibly content. I made every attempt to manually correct the errors within my dictation. However, errors related to voice recognition software may still exist and should be interpreted within the appropriate context.     Alysha LYLES (Irina), am scribing for, and in the presence of, Claudia Thompson M.D..    Electronically signed by: Alysha Ndiaye (Irina), 9/28/2020    Claudia LYLES M.D. personally performed the services described in this documentation, as scribed by Alysha Ndiaye in my presence, and it is both accurate and complete. C    The note accurately reflects work and decisions made by me.  Claudia WYATT  MIKA Thompson  9/29/2020  1:56 AM

## 2020-09-28 NOTE — ED NOTES
Pt ambulated to room with ER tech without difficulty, blood work already done in waiting room, chart up for ERP eval

## 2020-09-28 NOTE — ED TRIAGE NOTES
"Ambulates to triage after an EKG  Chief Complaint   Patient presents with   • Chest Pain     on and off x2 weeks, had \"stabbing\" CP on Saturday morning for a few seconds   • Arm Pain     L arm   • Numbness     LFA and hand     Pt has no sx currently, VSS, protocol ordered.   "

## 2020-09-29 ENCOUNTER — APPOINTMENT (OUTPATIENT)
Dept: RADIOLOGY | Facility: MEDICAL CENTER | Age: 55
End: 2020-09-29
Attending: HOSPITALIST
Payer: COMMERCIAL

## 2020-09-29 VITALS
HEART RATE: 71 BPM | HEIGHT: 69 IN | WEIGHT: 193.12 LBS | OXYGEN SATURATION: 96 % | RESPIRATION RATE: 17 BRPM | DIASTOLIC BLOOD PRESSURE: 85 MMHG | SYSTOLIC BLOOD PRESSURE: 141 MMHG | BODY MASS INDEX: 28.6 KG/M2 | TEMPERATURE: 96.9 F

## 2020-09-29 PROBLEM — R07.89 OTHER CHEST PAIN: Status: RESOLVED | Noted: 2020-09-28 | Resolved: 2020-09-29

## 2020-09-29 LAB
CHOLEST SERPL-MCNC: 121 MG/DL (ref 100–199)
COVID ORDER STATUS COVID19: NORMAL
EKG IMPRESSION: NORMAL
HDLC SERPL-MCNC: 55 MG/DL
LDLC SERPL CALC-MCNC: 49 MG/DL
SARS-COV-2 RNA RESP QL NAA+PROBE: NOTDETECTED
SPECIMEN SOURCE: NORMAL
TRIGL SERPL-MCNC: 86 MG/DL (ref 0–149)
TROPONIN T SERPL-MCNC: <6 NG/L (ref 6–19)

## 2020-09-29 PROCEDURE — 700111 HCHG RX REV CODE 636 W/ 250 OVERRIDE (IP)

## 2020-09-29 PROCEDURE — G0378 HOSPITAL OBSERVATION PER HR: HCPCS

## 2020-09-29 PROCEDURE — U0003 INFECTIOUS AGENT DETECTION BY NUCLEIC ACID (DNA OR RNA); SEVERE ACUTE RESPIRATORY SYNDROME CORONAVIRUS 2 (SARS-COV-2) (CORONAVIRUS DISEASE [COVID-19]), AMPLIFIED PROBE TECHNIQUE, MAKING USE OF HIGH THROUGHPUT TECHNOLOGIES AS DESCRIBED BY CMS-2020-01-R: HCPCS

## 2020-09-29 PROCEDURE — 96372 THER/PROPH/DIAG INJ SC/IM: CPT

## 2020-09-29 PROCEDURE — 84484 ASSAY OF TROPONIN QUANT: CPT

## 2020-09-29 PROCEDURE — 99217 PR OBSERVATION CARE DISCHARGE: CPT | Performed by: STUDENT IN AN ORGANIZED HEALTH CARE EDUCATION/TRAINING PROGRAM

## 2020-09-29 PROCEDURE — 36415 COLL VENOUS BLD VENIPUNCTURE: CPT

## 2020-09-29 PROCEDURE — 80061 LIPID PANEL: CPT

## 2020-09-29 PROCEDURE — 93005 ELECTROCARDIOGRAM TRACING: CPT | Performed by: HOSPITALIST

## 2020-09-29 PROCEDURE — A9270 NON-COVERED ITEM OR SERVICE: HCPCS | Performed by: HOSPITALIST

## 2020-09-29 PROCEDURE — 93010 ELECTROCARDIOGRAM REPORT: CPT | Performed by: INTERNAL MEDICINE

## 2020-09-29 PROCEDURE — 700111 HCHG RX REV CODE 636 W/ 250 OVERRIDE (IP): Performed by: HOSPITALIST

## 2020-09-29 PROCEDURE — A9502 TC99M TETROFOSMIN: HCPCS

## 2020-09-29 PROCEDURE — 700102 HCHG RX REV CODE 250 W/ 637 OVERRIDE(OP): Performed by: HOSPITALIST

## 2020-09-29 RX ORDER — REGADENOSON 0.08 MG/ML
INJECTION, SOLUTION INTRAVENOUS
Status: COMPLETED
Start: 2020-09-29 | End: 2020-09-29

## 2020-09-29 RX ORDER — QUETIAPINE FUMARATE 25 MG/1
50 TABLET, FILM COATED ORAL NIGHTLY PRN
Status: DISCONTINUED | OUTPATIENT
Start: 2020-09-29 | End: 2020-09-29 | Stop reason: HOSPADM

## 2020-09-29 RX ADMIN — ENOXAPARIN SODIUM 40 MG: 40 INJECTION SUBCUTANEOUS at 06:12

## 2020-09-29 RX ADMIN — LISINOPRIL 40 MG: 20 TABLET ORAL at 06:12

## 2020-09-29 RX ADMIN — ASPIRIN 81 MG: 81 TABLET, COATED ORAL at 06:12

## 2020-09-29 RX ADMIN — HYDROCHLOROTHIAZIDE 12.5 MG: 25 TABLET ORAL at 06:12

## 2020-09-29 RX ADMIN — REGADENOSON 0.4 MG: 0.08 INJECTION, SOLUTION INTRAVENOUS at 12:15

## 2020-09-29 RX ADMIN — ESCITALOPRAM OXALATE 20 MG: 10 TABLET ORAL at 06:12

## 2020-09-29 ASSESSMENT — LIFESTYLE VARIABLES
AVERAGE NUMBER OF DAYS PER WEEK YOU HAVE A DRINK CONTAINING ALCOHOL: 0
ON A TYPICAL DAY WHEN YOU DRINK ALCOHOL HOW MANY DRINKS DO YOU HAVE: 0
DOES PATIENT WANT TO STOP DRINKING: NO
CONSUMPTION TOTAL: NEGATIVE
TOTAL SCORE: 0
EVER HAD A DRINK FIRST THING IN THE MORNING TO STEADY YOUR NERVES TO GET RID OF A HANGOVER: NO
HOW MANY TIMES IN THE PAST YEAR HAVE YOU HAD 5 OR MORE DRINKS IN A DAY: 0
TOTAL SCORE: 0
ALCOHOL_USE: YES
HAVE YOU EVER FELT YOU SHOULD CUT DOWN ON YOUR DRINKING: NO
EVER FELT BAD OR GUILTY ABOUT YOUR DRINKING: NO
TOTAL SCORE: 0
HAVE PEOPLE ANNOYED YOU BY CRITICIZING YOUR DRINKING: NO

## 2020-09-29 ASSESSMENT — COGNITIVE AND FUNCTIONAL STATUS - GENERAL
SUGGESTED CMS G CODE MODIFIER DAILY ACTIVITY: CH
DAILY ACTIVITIY SCORE: 24
MOBILITY SCORE: 24
SUGGESTED CMS G CODE MODIFIER MOBILITY: CH

## 2020-09-29 ASSESSMENT — PATIENT HEALTH QUESTIONNAIRE - PHQ9
SUM OF ALL RESPONSES TO PHQ9 QUESTIONS 1 AND 2: 0
2. FEELING DOWN, DEPRESSED, IRRITABLE, OR HOPELESS: NOT AT ALL
1. LITTLE INTEREST OR PLEASURE IN DOING THINGS: NOT AT ALL

## 2020-09-29 NOTE — H&P
"Hospital Medicine History & Physical Note    Date of Service  2020    Primary Care Physician  [facility], Dr. Steve    Consultants  none    Code Status  Full Code    Chief Complaint  Chief Complaint   Patient presents with   • Chest Pain     on and off x2 weeks, had \"stabbing\" CP on Saturday morning for a few seconds   • Arm Pain     L arm   • Numbness     LFA and hand       History of Presenting Illness  54 y.o. male who presented 2020 with chest pain.  Mr. Durna has a history of non-insulin-dependent diabetes mellitus, hypertension, dyslipidemia, that presents emergency room today with 1 week of chest \"pressure\".  It is in the left chest and radiates down to the left bicep which she describes as \"tight\".  He is also had 2 weeks of off and on numbness in left hand that has not been associated with a chest pain no.  He states the chest pain is worse with stress though does not seem to be exertional in nature.  He went to urgent care with these complaints and was referred to the emergency room.  Here his EKG does show Q waves in III and aVF is a strong family history as his father  at 59 of a heart attack.  He does chew tobacco, has diabetes, hypertension, and dyslipidemia though he has never had a heart attack nor stroke.  Mr. Duran will be admitted under observation status for serial troponins, aspirin, and stress test in the morning.    He denies exposure to persons known to have COVID.  He denies fevers and chills, no cough or shortness of breath, no change in taste or smell no body aches, a screening COVID has been ordered per hospital policy.  Review of Systems  Review of Systems   Constitutional: Negative for chills and fever.   Respiratory: Negative for shortness of breath.    Cardiovascular: Negative for chest pain.   Gastrointestinal: Negative for diarrhea and vomiting.   All other systems reviewed and are negative.      Past Medical History   has a past medical history of Anxiety, " ASTHMA, Bipolar disorder (HCC), Depression, Hypertension, Hyponatremia, IV drug user, Panic disorder, Substance abuse (HCC), and Suicide attempt ([facility]).    Surgical History  Thoracotomy due to pulmonary abscess     Family History  Father  at 59 of a heart attack     Social History  He chews tobacco, no EtOH, no drugs    Allergies  Allergies   Allergen Reactions   • Benadryl [Diphenhydramine] Anxiety   • Codeine Hives       Medications  Prior to Admission Medications   Prescriptions Last Dose Informant Patient Reported? Taking?   BIOTIN PO 2020 at 0500 Patient Yes Yes   Sig: Take 1 Tab by mouth every day.   Cyanocobalamin (B-12 PO) 2020 at 0500 Patient Yes Yes   Sig: Take 1 Tab by mouth every day.   Flaxseed, Linseed, (FLAXSEED OIL PO) 2020 at 0500 Patient Yes Yes   Sig: Take 1 Cap by mouth every day.   Multiple Vitamins-Minerals (MULTI FOR HIM 50+ PO) 2020 at 0500 Patient Yes No   Sig: Take 1 Tab by mouth every day.   OXcarbazepine (TRILEPTAL) 300 MG Tab 2020 at 2100 Patient Yes Yes   Sig: Take 1,200 mg by mouth every bedtime. Pt reports that he takes 4 tablets at night   Omega-3 Fatty Acids (OMEGA 3 PO) 2020 at 0500 Patient Yes No   Sig: Take 1 Tab by mouth every day.   Potassium 99 MG Tab 2020 at 0500 Patient Yes Yes   Sig: Take 99 mg by mouth every day.   albuterol (VENTOLIN OR PROVENTIL) 108 (90 BASE) MCG/ACT AERS 2020 at Unknown Patient Yes No   Sig: Inhale 1 Puff by mouth every 6 hours as needed.   aspirin EC (ECOTRIN) 81 MG Tablet Delayed Response 2020 at 0500 Patient Yes No   Sig: Take 81 mg by mouth every day.   atorvastatin (LIPITOR) 40 MG Tab 2020 at 2100 Patient Yes Yes   Sig: Take 40 mg by mouth every evening.   escitalopram (LEXAPRO) 10 MG Tab 2020 at 0500 Patient Yes Yes   Sig: Take 20 mg by mouth every day.   hydroCHLOROthiazide (HYDRODIURIL) 12.5 MG tablet 2020 at 0500 Patient Yes Yes   Sig: Take 12.5 mg by mouth every day.   lisinopril  (PRINIVIL) 40 MG tablet 9/28/2020 at 0500 Patient Yes Yes   Sig: Take 40 mg by mouth every day.   metformin (GLUCOPHAGE) 1000 MG tablet 9/28/2020 at 0500 Patient Yes Yes   Sig: Take 1,000 mg by mouth 2 times a day, with meals.   pramipexole (MIRAPEX) 1 MG Tab 9/27/2020 at 2100 Patient Yes Yes   Sig: Take 1 mg by mouth every bedtime.   quetiapine (SEROQUEL) 50 MG tablet > 2 weeks at Unknown Patient Yes Yes   Sig: Take 50 mg by mouth as needed (For sleep).      Facility-Administered Medications: None       Physical Exam  Temp:  [36.3 °C (97.3 °F)] 36.3 °C (97.3 °F)  Pulse:  [70] 70  Resp:  [14] 14  BP: (118)/(82) 118/82  SpO2:  [93 %] 93 %    Physical Exam  Vitals signs and nursing note reviewed.   Constitutional:       Appearance: Normal appearance.   HENT:      Head: Normocephalic and atraumatic.      Mouth/Throat:      Mouth: Mucous membranes are dry.      Pharynx: Oropharynx is clear.   Eyes:      General: No scleral icterus.     Conjunctiva/sclera: Conjunctivae normal.   Neck:      Musculoskeletal: Normal range of motion and neck supple.      Comments: No bruits   Cardiovascular:      Rate and Rhythm: Normal rate and regular rhythm.      Heart sounds: No murmur.      Comments: No chest wall tenderness  Pulmonary:      Effort: Pulmonary effort is normal. No respiratory distress.      Breath sounds: Normal breath sounds.   Abdominal:      General: There is no distension.      Tenderness: There is no abdominal tenderness.   Musculoskeletal:      Right lower leg: No edema.      Left lower leg: No edema.   Skin:     General: Skin is warm and dry.   Neurological:      General: No focal deficit present.      Mental Status: He is alert and oriented to person, place, and time.   Psychiatric:         Mood and Affect: Mood normal.         Behavior: Behavior normal.         Laboratory:  Recent Labs     09/28/20  1413   WBC 7.3   RBC 5.00   HEMOGLOBIN 15.5   HEMATOCRIT 43.4   MCV 86.8   MCH 31.0   MCHC 35.7*   RDW 39.8    PLATELETCT 209   MPV 10.9     Recent Labs     09/28/20  1413   SODIUM 136   POTASSIUM 3.9   CHLORIDE 99   CO2 25   GLUCOSE 215*   BUN 7*   CREATININE 0.45*   CALCIUM 9.3     Recent Labs     09/28/20  1413   ALTSGPT 22   ASTSGOT 19   ALKPHOSPHAT 70   TBILIRUBIN 0.6   GLUCOSE 215*         No results for input(s): NTPROBNP in the last 72 hours.      Recent Labs     09/28/20  1413   TROPONINT 8       Imaging:  NM-CARDIAC STRESS TEST    (Results Pending)     EKG: interpreted by me--sinus 72, borderline Q in III, Q in AVF    Assessment/Plan:  I anticipate this patient is appropriate for observation status at this time.    * Other chest pain- (present on admission)  Assessment & Plan  Left sided chest pain  His risk factors for unstable angina include age, gender, family hx, htn, dyslipidemia, DM  Aspirin and serial troponins ordered  Continuous telemetry monitoring  Stress test ordered for the morning      Type 2 diabetes mellitus with hyperglycemia, without long-term current use of insulin (HCC)- (present on admission)  Assessment & Plan  He is on metformin 1,000 mg BID  Glucose elevated at 215 in the ER  Glycohemoglobin ordered    Dyslipidemia- (present on admission)  Assessment & Plan  Continue his outpatient statin  Fasting lipid panel ordered for the morning    Tobacco abuse- (present on admission)  Assessment & Plan  He chews tobacco  Cessation discussed    Hypertension- (present on admission)  Assessment & Plan  Continue lisinopril and hctz    Bipolar disorder (CMS-HCC)- (present on admission)  Assessment & Plan  Continue trileptal and seroquel

## 2020-09-29 NOTE — PROGRESS NOTES
54M h/o Dm, Htn, dyslipidemia with CP left sided x 2 days.  EKG ST depression lateral leads. Troponin negative.    Admit to R/o ACS. Hospitalist, Dr. Moran to f/u.

## 2020-09-29 NOTE — ASSESSMENT & PLAN NOTE
Left sided chest pain  His risk factors for unstable angina include age, gender, family hx, htn, dyslipidemia, DM  Aspirin and serial troponins ordered  Continuous telemetry monitoring  Stress test ordered for the morning

## 2020-09-29 NOTE — DISCHARGE SUMMARY
"Discharge Summary    CHIEF COMPLAINT ON ADMISSION  Chief Complaint   Patient presents with   • Chest Pain     on and off x2 weeks, had \"stabbing\" CP on Saturday morning for a few seconds   • Arm Pain     L arm   • Numbness     LFA and hand       Reason for Admission  Arm Pain; Numbness     Admission Date  2020    CODE STATUS  Full Code    HPI & HOSPITAL COURSE  This is a 54 y.o. male here with chest pain. Mr. Duran has a history of non-insulin-dependent diabetes mellitus, hypertension, dyslipidemia, that presents emergency room today with 1 week of left sided chest \"pressure\" radiating to left biceps. Pain worse with stress and was not associated with exertion. Patient went to urgent care and was sent to ED for workup. Initial EKG showed possible Q waves in III, aVF and with strong family history (father  at 59 from heart attack) he was admitted for observation and stress test.    Troponin were trended and unremarkable. Stress test performed  showed normal myocardial perfusion with no ischemia. LVEF 57%. Patient was informed of results and was ready for discharge.    Patient also noting numbness and occasional pain / tingling of his left hand radiating to elbow. Tinel test +, may be carpal tunnel vs radiculopathy. Counseled to try OTC hand splint and follow up with PCP for further workup and management. Patient already has appointment with PCP in a few weeks.       Therefore, he is discharged in good and stable condition to home with close outpatient follow-up.    The patient recovered much more quickly than anticipated on admission.    Discharge Date  2020    FOLLOW UP ITEMS POST DISCHARGE  Follow up with PCP    DISCHARGE DIAGNOSES  Principal Problem (Resolved):    Other chest pain POA: Yes  Active Problems:    Type 2 diabetes mellitus with hyperglycemia, without long-term current use of insulin (HCC) POA: Yes    Hypertension POA: Yes    Tobacco abuse POA: Yes    Dyslipidemia POA: Yes    Bipolar " Surgery is scheduled for 10/15/18 arrival time per the preop nurse.  Preop will call from [phone]  Fasting after midnight  Someone to drive you home      THE PREOP NURSE WILL CALL, SOMETIMES AS LATE AS 4 PM IN THE AFTERNOON THE DAY BEFORE SURGERY.    Bathe the night before and the morning of your procedure with a Chlorhexidine wash such as Hibiclens, can be purchased at most Pharmacy's.    Special Instruction:     disorder (CMS-HCC) POA: Yes      FOLLOW UP  No future appointments.  No follow-up provider specified.    MEDICATIONS ON DISCHARGE     Medication List      CONTINUE taking these medications      Instructions   albuterol 108 (90 Base) MCG/ACT Aers inhalation aerosol   Inhale 1 Puff by mouth every 6 hours as needed.  Dose: 1 Puff     aspirin EC 81 MG Tbec  Commonly known as: ECOTRIN   Take 81 mg by mouth every day.  Dose: 81 mg     atorvastatin 40 MG Tabs  Commonly known as: LIPITOR   Take 40 mg by mouth every evening.  Dose: 40 mg     B-12 PO   Take 1 Tab by mouth every day.  Dose: 1 Tab     BIOTIN PO   Take 1 Tab by mouth every day.  Dose: 1 Tab     escitalopram 10 MG Tabs  Commonly known as: LEXAPRO   Take 20 mg by mouth every day.  Dose: 20 mg     FLAXSEED OIL PO   Take 1 Cap by mouth every day.  Dose: 1 Cap     hydroCHLOROthiazide 12.5 MG tablet  Commonly known as: HYDRODIURIL   Take 12.5 mg by mouth every day.  Dose: 12.5 mg     lisinopril 40 MG tablet  Commonly known as: PRINIVIL   Take 40 mg by mouth every day.  Dose: 40 mg     metformin 1000 MG tablet  Commonly known as: GLUCOPHAGE   Take 1,000 mg by mouth 2 times a day, with meals.  Dose: 1,000 mg     Mirapex 1 MG Tabs  Generic drug: pramipexole   Take 1 mg by mouth every bedtime.  Dose: 1 mg     MULTI FOR HIM 50+ PO   Take 1 Tab by mouth every day.  Dose: 1 Tab     OMEGA 3 PO   Take 1 Tab by mouth every day.  Dose: 1 Tab     OXcarbazepine 300 MG Tabs  Commonly known as: TRILEPTAL   Take 1,200 mg by mouth every bedtime. Pt reports that he takes 4 tablets at night  Dose: 1,200 mg     Potassium 99 MG Tabs   Take 99 mg by mouth every day.  Dose: 99 mg     SEROquel 50 MG tablet  Generic drug: quetiapine   Take 50 mg by mouth as needed (For sleep).  Dose: 50 mg            Allergies  Allergies   Allergen Reactions   • Benadryl [Diphenhydramine] Anxiety   • Codeine Hives       DIET  Orders Placed This Encounter   Procedures   • Diet Order Diabetic     Standing  Status:   Standing     Number of Occurrences:   1     Order Specific Question:   Diet:     Answer:   Diabetic [3]       ACTIVITY  As tolerated.  Weight bearing as tolerated    CONSULTATIONS  None    PROCEDURES  9/29 NM Stress Test:  No evidence of significant jeopardized viable myocardium or prior myocardial infarction.   Normal left ventricular size, ejection fraction, and wall motion.        LABORATORY  Lab Results   Component Value Date    SODIUM 136 09/28/2020    POTASSIUM 3.9 09/28/2020    CHLORIDE 99 09/28/2020    CO2 25 09/28/2020    GLUCOSE 215 (H) 09/28/2020    BUN 7 (L) 09/28/2020    CREATININE 0.45 (L) 09/28/2020    CREATININE 0.81 06/02/2011        Lab Results   Component Value Date    WBC 7.3 09/28/2020    WBC 9.7 06/02/2011    HEMOGLOBIN 15.5 09/28/2020    HEMATOCRIT 43.4 09/28/2020    PLATELETCT 209 09/28/2020        Total time of the discharge process exceeds 30 minutes.

## 2020-09-29 NOTE — ED NOTES
Med rec updated and complete  Allergies reviewed  Interviewed pt with wife at bedside with permission from pt.  Pt reports that he takes OXCARBAZEPINE 300MG 4 tablets at bedtime.   Pt reports no antibiotics in the last 2 weeks

## 2020-09-29 NOTE — PROGRESS NOTES
Bedside report received. Patient A&Ox 4. No complaints of pain at this time. Patient complained of chest pain once over night. EKG obtained and showed no change from previous EKG. POC discussed with patient, patient verbalized understanding. Call light and personal belongings in reach. Bed locked and in lowest position. Alarm and fall precautions in place.

## 2020-09-29 NOTE — PROGRESS NOTES
2 RN Skin Check    2 RN skin check complete.   Devices in place: NA  Skin assessed under devices: yes.  Confirmed pressure ulcers found on: na.  New potential pressure ulcers noted on na. Wound consult placed N/A.  The following interventions in place Pillows and Lotion.

## 2020-09-29 NOTE — PROGRESS NOTES
Patient being discharged. Pt educated on discharge instructions. Pt verbalized understanding. Patient already has appointment made with his PCP on 10/13. PIV removed. Monitor checked in. Pt going home with wife. Escorted out via wheelchair.

## 2020-09-29 NOTE — DISCHARGE INSTRUCTIONS
Carpal Tunnel Syndrome    Carpal tunnel syndrome is a condition that causes pain in your hand and arm. The carpal tunnel is a narrow area that is on the palm side of your wrist. Repeated wrist motion or certain diseases may cause swelling in the tunnel. This swelling can pinch the main nerve in the wrist (median nerve).  What are the causes?  This condition may be caused by:  · Repeated wrist motions.  · Wrist injuries.  · Arthritis.  · A sac of fluid (cyst) or abnormal growth (tumor) in the carpal tunnel.  · Fluid buildup during pregnancy.  Sometimes the cause is not known.  What increases the risk?  The following factors may make you more likely to develop this condition:  · Having a job in which you move your wrist in the same way many times. This includes jobs like being a  or a .  · Being a woman.  · Having other health conditions, such as:  ? Diabetes.  ? Obesity.  ? A thyroid gland that is not active enough (hypothyroidism).  ? Kidney failure.  What are the signs or symptoms?  Symptoms of this condition include:  · A tingling feeling in your fingers.  · Tingling or a loss of feeling (numbness) in your hand.  · Pain in your entire arm. This pain may get worse when you bend your wrist and elbow for a long time.  · Pain in your wrist that goes up your arm to your shoulder.  · Pain that goes down into your palm or fingers.  · A weak feeling in your hands. You may find it hard to grab and hold items.  You may feel worse at night.  How is this diagnosed?  This condition is diagnosed with a medical history and physical exam. You may also have tests, such as:  · Electromyogram (EMG). This test checks the signals that the nerves send to the muscles.  · Nerve conduction study. This test checks how well signals pass through your nerves.  · Imaging tests, such as X-rays, ultrasound, and MRI. These tests check for what might be the cause of your condition.  How is this treated?  This condition may be treated  with:  · Lifestyle changes. You will be asked to stop or change the activity that caused your problem.  · Doing exercise and activities that make bones and muscles stronger (physical therapy).  · Learning how to use your hand again (occupational therapy).  · Medicines for pain and swelling (inflammation). You may have injections in your wrist.  · A wrist splint.  · Surgery.  Follow these instructions at home:  If you have a splint:  · Wear the splint as told by your doctor. Remove it only as told by your doctor.  · Loosen the splint if your fingers:  ? Tingle.  ? Lose feeling (become numb).  ? Turn cold and blue.  · Keep the splint clean.  · If the splint is not waterproof:  ? Do not let it get wet.  ? Cover it with a watertight covering when you take a bath or a shower.  Managing pain, stiffness, and swelling    · If told, put ice on the painful area:  ? If you have a removable splint, remove it as told by your doctor.  ? Put ice in a plastic bag.  ? Place a towel between your skin and the bag.  ? Leave the ice on for 20 minutes, 2-3 times per day.  General instructions  · Take over-the-counter and prescription medicines only as told by your doctor.  · Rest your wrist from any activity that may cause pain. If needed, talk with your boss at work about changes that can help your wrist heal.  · Do any exercises as told by your doctor, physical therapist, or occupational therapist.  · Keep all follow-up visits as told by your doctor. This is important.  Contact a doctor if:  · You have new symptoms.  · Medicine does not help your pain.  · Your symptoms get worse.  Get help right away if:  · You have very bad numbness or tingling in your wrist or hand.  Summary  · Carpal tunnel syndrome is a condition that causes pain in your hand and arm.  · It is often caused by repeated wrist motions.  · Lifestyle changes and medicines are used to treat this problem. Surgery may help in very bad cases.  · Follow your doctor's  instructions about wearing a splint, resting your wrist, keeping follow-up visits, and calling for help.  This information is not intended to replace advice given to you by your health care provider. Make sure you discuss any questions you have with your health care provider.  Document Released: 12/06/2012 Document Revised: 04/26/2019 Document Reviewed: 04/26/2019  [employer] Patient Education © 2020 [employer]    Chest Pain, Nonspecific  It is often hard to give a specific diagnosis for the cause of chest pain. There is always a chance that your pain could be related to something serious, like a heart attack or a blood clot in the lungs. You need to follow up with your caregiver for further evaluation. More lab tests or other studies such as X-rays, electrocardiography, stress testing, or cardiac imaging may be needed to find the cause of your pain.  Most of the time, nonspecific chest pain improves within 2 to 3 days with rest and mild pain medicine. For the next few days, avoid physical exertion or activities that bring on pain. Do not smoke. Avoid drinking alcohol. Call your caregiver for routine follow-up as advised.   SEEK IMMEDIATE MEDICAL CARE IF:  · You develop increased chest pain or pain that radiates to the arm, neck, jaw, back, or abdomen.   · You develop shortness of breath, increased coughing, or you start coughing up blood.   · You have severe back or abdominal pain, nausea, or vomiting.   · You develop severe weakness, fainting, fever, or chills.   Document Released: 12/18/2006 Document Revised: 03/11/2013 Document Reviewed: 06/06/2008  [employer]® Patient Information ©2013 [employer].    Discharge Instructions    Discharged to home by car with relative. Discharged via wheelchair, hospital escort: Yes.  Special equipment needed: Not Applicable    Be sure to schedule a follow-up appointment with your primary care doctor or any specialists as instructed.     Discharge Plan:   Diet Plan:  Discussed  Activity Level: Discussed  Confirmed Follow up Appointment: Patient to Call and Schedule Appointment  Confirmed Symptoms Management: Discussed  Medication Reconciliation Updated: Yes  Influenza Vaccine Indication: Patient Refuses    I understand that a diet low in cholesterol, fat, and sodium is recommended for good health. Unless I have been given specific instructions below for another diet, I accept this instruction as my diet prescription.   Other diet: diabetic    Special Instructions: None    · Is patient discharged on Warfarin / Coumadin?   No     Depression / Suicide Risk    As you are discharged from this [facility] facility, it is important to learn how to keep safe from harming yourself.    Recognize the warning signs:  · Abrupt changes in personality, positive or negative- including increase in energy   · Giving away possessions  · Change in eating patterns- significant weight changes-  positive or negative  · Change in sleeping patterns- unable to sleep or sleeping all the time   · Unwillingness or inability to communicate  · Depression  · Unusual sadness, discouragement and loneliness  · Talk of wanting to die  · Neglect of personal appearance   · Rebelliousness- reckless behavior  · Withdrawal from people/activities they love  · Confusion- inability to concentrate     If you or a loved one observes any of these behaviors or has concerns about self-harm, here's what you can do:  · Talk about it- your feelings and reasons for harming yourself  · Remove any means that you might use to hurt yourself (examples: pills, rope, extension cords, firearm)  · Get professional help from the community (Mental Health, Substance Abuse, psychological counseling)  · Do not be alone:Call your Safe Contact- someone whom you trust who will be there for you.  · Call your local CRISIS HOTLINE 604-4130 or 538-123-1766  · Call your local Children's Mobile Crisis Response Team [place] (580) 743-4125 or  www.[employer]  · Call the toll free National Suicide Prevention Hotlines   · National Suicide Prevention Lifeline 345-912-LWLF (0710)  · National Hope Line Network 800-SUICIDE (248-4957)

## 2020-09-29 NOTE — PROGRESS NOTES
Pt to T804-1 from ED via rebecca by RN on monitor. Bedside report received from ED RN. All questions answered. Pt and family aware of POC. All belongings and chart with pt. Standing weight and vitals obtained. Pt attached to monitor, monitor room notified. Pt resting in bed. Call light in reach and side rails up.

## 2020-11-16 ENCOUNTER — HOSPITAL ENCOUNTER (OUTPATIENT)
Dept: LAB | Facility: MEDICAL CENTER | Age: 55
End: 2020-11-16
Attending: NURSE PRACTITIONER
Payer: COMMERCIAL

## 2020-11-16 ENCOUNTER — NURSE TRIAGE (OUTPATIENT)
Dept: HEALTH INFORMATION MANAGEMENT | Facility: OTHER | Age: 55
End: 2020-11-16

## 2020-11-16 ENCOUNTER — TELEMEDICINE (OUTPATIENT)
Dept: TELEHEALTH | Facility: TELEMEDICINE | Age: 55
End: 2020-11-16
Payer: COMMERCIAL

## 2020-11-16 DIAGNOSIS — J98.8 VIRAL RESPIRATORY ILLNESS: ICD-10-CM

## 2020-11-16 DIAGNOSIS — J45.909 ASTHMATIC BRONCHITIS WITHOUT COMPLICATION, UNSPECIFIED ASTHMA SEVERITY, UNSPECIFIED WHETHER PERSISTENT: ICD-10-CM

## 2020-11-16 DIAGNOSIS — B97.89 VIRAL RESPIRATORY ILLNESS: ICD-10-CM

## 2020-11-16 PROCEDURE — U0003 INFECTIOUS AGENT DETECTION BY NUCLEIC ACID (DNA OR RNA); SEVERE ACUTE RESPIRATORY SYNDROME CORONAVIRUS 2 (SARS-COV-2) (CORONAVIRUS DISEASE [COVID-19]), AMPLIFIED PROBE TECHNIQUE, MAKING USE OF HIGH THROUGHPUT TECHNOLOGIES AS DESCRIBED BY CMS-2020-01-R: HCPCS

## 2020-11-16 PROCEDURE — C9803 HOPD COVID-19 SPEC COLLECT: HCPCS

## 2020-11-16 PROCEDURE — 99214 OFFICE O/P EST MOD 30 MIN: CPT | Mod: 95,CR,CS | Performed by: NURSE PRACTITIONER

## 2020-11-16 RX ORDER — METHYLPREDNISOLONE 4 MG/1
TABLET ORAL
Qty: 21 TAB | Refills: 0 | Status: SHIPPED | OUTPATIENT
Start: 2020-11-16

## 2020-11-16 ASSESSMENT — ENCOUNTER SYMPTOMS
SHORTNESS OF BREATH: 0
COUGH: 1
WHEEZING: 1
HEMOPTYSIS: 0
SPUTUM PRODUCTION: 1
FEVER: 0
ABDOMINAL PAIN: 0
DIARRHEA: 1
SORE THROAT: 1

## 2020-11-16 NOTE — PROGRESS NOTES
Subjective:     El LANDEROS Renee III is a 54 y.o. male who presents for No chief complaint on file.      Started 11/8, woke up sick with a sore throat and cough. Now has a scratchy throat. Smell is off. Diarrhea. Fatigued. Robitussin DM, has temporarily helped with cough. Cold weather increases asthma. No chest pain. White sputum with cough, with flem. Hx of PNA. No known COVID exposure.     Cough  This is a new problem. The current episode started in the past 7 days. The problem has been waxing and waning. The cough is productive of sputum. Associated symptoms include a sore throat and wheezing. Pertinent negatives include no chest pain, fever, hemoptysis or shortness of breath. Nothing aggravates the symptoms. He has tried a beta-agonist inhaler for the symptoms. The treatment provided mild relief. His past medical history is significant for asthma, bronchitis and pneumonia.       Past Medical History:   Diagnosis Date   • Anxiety    • ASTHMA     seasonal asthma   • Bipolar disorder (HCC)    • Depression    • Hypertension    • Hyponatremia    • IV drug user    • Panic disorder    • Substance abuse ([facility])    • Suicide attempt ([facility])        No past surgical history on file.    Social History     Socioeconomic History   • Marital status:      Spouse name: Not on file   • Number of children: Not on file   • Years of education: Not on file   • Highest education level: Not on file   Occupational History   • Not on file   Social Needs   • Financial resource strain: Not on file   • Food insecurity     Worry: Not on file     Inability: Not on file   • Transportation needs     Medical: Not on file     Non-medical: Not on file   Tobacco Use   • Smoking status: Light Tobacco Smoker   • Smokeless tobacco: Never Used   Substance and Sexual Activity   • Alcohol use: Not on file     Comment: DENIES   • Drug use: Not on file     Comment: history of meth use - several years ago   • Sexual activity: Never     Partners: Female      Comment:  6 years    Lifestyle   • Physical activity     Days per week: Not on file     Minutes per session: Not on file   • Stress: Not on file   Relationships   • Social connections     Talks on phone: Not on file     Gets together: Not on file     Attends Scientologist service: Not on file     Active member of club or organization: Not on file     Attends meetings of clubs or organizations: Not on file     Relationship status: Not on file   • Intimate partner violence     Fear of current or ex partner: Not on file     Emotionally abused: Not on file     Physically abused: Not on file     Forced sexual activity: Not on file   Other Topics Concern   • Not on file   Social History Narrative    ** Merged History Encounter **             Family History   Problem Relation Age of Onset   • Heart Disease Father         MI at 59   • Diabetes Maternal Grandmother    • Hypertension Maternal Grandmother    • Cancer Paternal Grandfather         brain tumor    • Suicide Attempts Maternal Uncle    • Suicide Attempts Cousin         Allergies   Allergen Reactions   • Benadryl [Diphenhydramine] Anxiety   • Codeine Hives       Review of Systems   Constitutional: Positive for malaise/fatigue. Negative for fever.   HENT: Positive for sore throat.    Respiratory: Positive for cough, sputum production and wheezing. Negative for hemoptysis and shortness of breath.    Cardiovascular: Negative for chest pain.   Gastrointestinal: Positive for diarrhea. Negative for abdominal pain.   All other systems reviewed and are negative.       Objective:   There were no vitals taken for this visit.    Physical Exam  Constitutional:       General: He is not in acute distress.     Appearance: He is well-developed. He is not toxic-appearing.   HENT:      Head: Normocephalic and atraumatic.      Right Ear: External ear normal.      Left Ear: External ear normal.      Nose: Nose normal.      Mouth/Throat:      Mouth: Mucous membranes are moist.   Eyes:       Conjunctiva/sclera: Conjunctivae normal.   Neck:      Musculoskeletal: Normal range of motion. No neck rigidity.   Pulmonary:      Effort: Pulmonary effort is normal. No tachypnea, bradypnea, accessory muscle usage, prolonged expiration, respiratory distress or retractions.      Breath sounds: No stridor.      Comments: RR 18. No audible wheeze. Patient able to speak clearly. RR 18. Cough noted.   Musculoskeletal: Normal range of motion.   Skin:     General: Skin is warm and dry.      Coloration: Skin is not cyanotic or pale.      Findings: No rash.   Neurological:      General: No focal deficit present.      Mental Status: He is alert and oriented to person, place, and time.      GCS: GCS eye subscore is 4. GCS verbal subscore is 5. GCS motor subscore is 6.   Psychiatric:         Mood and Affect: Mood normal.         Speech: Speech normal.         Behavior: Behavior normal.         Thought Content: Thought content normal.         Judgment: Judgment normal.         Assessment/Plan:   1. Asthmatic bronchitis without complication, unspecified asthma severity, unspecified whether persistent  - COVID/SARS COV-2 PCR; Future  - methylPREDNISolone (MEDROL DOSEPAK) 4 MG Tablet Therapy Pack; Follow schedule on package instructions.  Dispense: 21 Tab; Refill: 0    2. Viral respiratory illness  - COVID/SARS COV-2 PCR; Future  Discussed viral etiology. COVID S&S, and self isolation guidelines. S&S of PNA with follow up. Discussed follow up in clinic to be re-evaluated if symptoms persist or worsen. Noted to currently be at work.     Symptomatic care.  -Oral hydration and rest.   -Cough control: nonpharmacologic options for cough relief such as throat lozenges, hot tea, honey.  -Over the counter expectorant as directed; Guaifenesin (Mucinex).  -Tylenol or ibuprofen for pain and fever as directed.   -Albuterol inhaler as directed.    Follow up with PCP. Follow up for increased or persistent shortness of breath or wheezing,  fevers, elevated heart rate, weakness, prolonged cough, chest pain, or any other concerns.    Differential diagnosis, natural history, supportive care, and indications for immediate follow-up discussed.    This evaluation was conducted via Zoom using secure and encrypted videoconferencing technology. The patient was in a private location in the [facility].    The patient's identity was confirmed and verbal consent was obtained for this virtual visit.

## 2020-11-16 NOTE — PATIENT INSTRUCTIONS
INSTRUCTIONS FOR COVID-19 OR ANY OTHER INFECTIOUS RESPIRATORY ILLNESSES    The Centers for Disease Control and Prevention (CDC) states that early indications for COVID-19 include cough, shortness of breath, difficulty breathing, or at least two of the following symptoms: chills, shaking with chills, muscle pain, headache, sore throat, and loss of taste or smell. Symptoms can range from mild to severe and may appear up to two weeks after exposure to the virus.    The practice of self-isolation and quarantine helps protect the public and your family by  preventing exposure to people who have or may have a contagious disease. Please follow the prevention steps below as based on CDC guidelines:    WHEN TO STOP ISOLATION: Persons with COVID-19 or any other infectious respiratory illness who have symptoms and were advised to care for themselves at home may discontinue home isolation under the following conditions:  · At least 24 hours have passed since recovery defined as resolution of fever without the use of fever-reducing medications; AND,  · Improvement in respiratory symptoms (e.g., cough, shortness of breath); AND,  · At least 10 days have passed since symptoms first appeared and have had no subsequent illness.    MONITOR YOUR SYMPTOMS: If your illness is worsening, seek prompt medical attention. If you have a medical emergency and need to call 911, notify the dispatch personnel that you have, or are being evaluated for confirmed or suspected COVID-19 or another infectious respiratory illness. Wear a facemask if possible.    ACTIVITY RESTRICTION: restrict activities outside your home, except for getting medical care. Do not go to work, school, or public areas. Avoid using public transportation, ride-sharing, or taxis.    SCHEDULED MEDICAL APPOINTMENTS: Notify your provider that you have, or are being evaluated for, confirmed or suspected COVID-19 or another infectious respiratory. This will help the healthcare  provider’s office safely take care of you and keep other people from getting exposed or infected.    FACEMASKS, when to wear: Anytime you are away from your home or around other people or pets. If you are unable to wear one, maintain a minimum of 6 feet distancing from others.    LIVING ENVIRONMENT: Stay in a separate room from other people and pets. If possible, use a separate bathroom, have someone else care for your pets and avoid sharing household items. Any items used should be washed thoroughly with soap and water. Clean all “high-touch” surfaces every day. Use a household cleaning spray or wipe, according to the label instructions. High touch surfaces include (but are not limited to) counters, tabletops, doorknobs, bathroom fixtures, toilets, phones, keyboards, tablets, and bedside tables.     HAND WASHING: Frequently wash hands with soap and water for at least 20 seconds,  especially after blowing your nose, coughing, or sneezing; going to the bathroom; before and after interacting with pets; and before and after eating or preparing food. If hands are visibly dirty use soap and water. If soap and water are not available, use an alcohol-based hand  with at least 60% alcohol. Avoid touching your eyes, nose, and mouth with unwashed hands. Cover your coughs and sneezes with a tissue. Throw used tissues in a lined trash can. Immediately wash your hands.    ACTIVE/FACILITATED SELF-MONITORING: Follow instructions provided by your local health department or health professionals, as appropriate. When working with your local health department check their available hours.    [facility]   Phone Number   [facility] [phone]   [facility] [place] [phone]   [place] [address]   [place] [phone]     IF YOU HAVE CONFIRMED POSITIVE COVID-19:    Those who have completely recovered from COVID-19 may have immune-boosting antibodies in their plasma--called “convalescent plasma”--that could be  used to treat critically ill COVID19 patients.    [facility] is excited to begin working with [facility] on collecting convalescent plasma from  people who have recovered from COVID-19 as part of a program to treat patients infected with the virus. This FDA-approved “emergency investigational new drug” is a special blood product containing antibodies that may give patients an extra boost to fight the virus.    To be eligible to donate convalescent plasma, you must have a prior COVID-19 diagnosis documented by a laboratory test (or a positive test result for SARS-CoV-2 antibodies) and meet additional eligibility requirements.    If you are interested in donating convalescent plasma or have any additional questions, please contact the [facility] Convalescent Plasma  at [phone] or via e-mail at [email or web address].      Bronchitis  Bronchitis is the body's way of reacting to injury and/or infection (inflammation) of the bronchi. Bronchi are the air tubes that extend from the windpipe into the lungs. If the inflammation becomes severe, it may cause shortness of breath.  CAUSES   Inflammation may be caused by:  · A virus.  · Germs (bacteria).  · Dust.  · Allergens.  · Pollutants and many other irritants.  The cells lining the bronchial tree are covered with tiny hairs (cilia). These constantly beat upward, away from the lungs, toward the mouth. This keeps the lungs free of pollutants. When these cells become too irritated and are unable to do their job, mucus begins to develop. This causes the characteristic cough of bronchitis. The cough clears the lungs when the cilia are unable to do their job. Without either of these protective mechanisms, the mucus would settle in the lungs. Then you would develop pneumonia.  Smoking is a common cause of bronchitis and can contribute to pneumonia. Stopping this habit is the single most important thing you can do to help yourself.  TREATMENT   · Your  caregiver may prescribe an antibiotic if the cough is caused by bacteria. Also, medicines that open up your airways make it easier to breathe. Your caregiver may also recommend or prescribe an expectorant. It will loosen the mucus to be coughed up. Only take over-the-counter or prescription medicines for pain, discomfort, or fever as directed by your caregiver.  · Removing whatever causes the problem (smoking, for example) is critical to preventing the problem from getting worse.  · Cough suppressants may be prescribed for relief of cough symptoms.  · Inhaled medicines may be prescribed to help with symptoms now and to help prevent problems from returning.  · For those with recurrent (chronic) bronchitis, there may be a need for steroid medicines.  SEEK IMMEDIATE MEDICAL CARE IF:   · During treatment, you develop more pus-like mucus (purulent sputum).  · You have a fever.  · Your baby is older than 3 months with a rectal temperature of 102° F (38.9° C) or higher.  · Your baby is 3 months old or younger with a rectal temperature of 100.4° F (38° C) or higher.  · You become progressively more ill.  · You have increased difficulty breathing, wheezing, or shortness of breath.  It is necessary to seek immediate medical care if you are elderly or sick from any other disease.  MAKE SURE YOU:   · Understand these instructions.  · Will watch your condition.  · Will get help right away if you are not doing well or get worse.  Document Released: 12/18/2006 Document Revised: 03/11/2013 Document Reviewed: 10/27/2009  [employer]® Patient Information ©2014 [employer].

## 2020-11-16 NOTE — TELEPHONE ENCOUNTER
"    Additional Information  • [1] Adult has symptoms of COVID-19 (fever, cough or SOB) AND [2] major community spread where patient lives AND [3] testing not being done for mild symptoms    Answer Assessment - Initial Assessment Questions  1. COVID-19 DIAGNOSIS: \"Who made your Coronavirus (COVID-19) diagnosis?\" \"Was it confirmed by a positive lab test?\" If not diagnosed by a HCP, ask \"Are there lots of cases (community spread) where you live?\" (See public health department website, if unsure)    * MAJOR community spread: high number of cases; numbers of cases are increasing; many people hospitalized.    * MINOR community spread: low number of cases; not increasing; few or no people hospitalized      major  2. ONSET: \"When did the COVID-19 symptoms start?\"       11/8  3. WORST SYMPTOM: \"What is your worst symptom?\" (e.g., cough, fever, shortness of breath, muscle aches)      Weak, tired all the time  4. COUGH: \"How bad is the cough?\"        Bad when not taking medicine, it does not stop  5. FEVER: \"Do you have a fever?\" If so, ask: \"What is your temperature, how was it measured, and when did it start?\"      Not now  6. RESPIRATORY STATUS: \"Describe your breathing?\" (e.g., shortness of breath, wheezing, unable to speak)       Sob & wheezing, using inhaler every couple of hours  7. BETTER-SAME-WORSE: \"Are you getting better, staying the same or getting worse compared to yesterday?\"  If getting worse, ask, \"In what way?\"      same  8. HIGH RISK DISEASE: \"Do you have any chronic medical problems?\" (e.g., asthma, heart or lung disease, weak immune system, etc.)      DM, asthma, HTN  9. PREGNANCY: \"Is there any chance you are pregnant?\" \"When was your last menstrual period?\"      NA  10. OTHER SYMPTOMS: \"Do you have any other symptoms?\"  (e.g., runny nose, headache, sore throat, loss of smell)        Fatigue, sinus congestin, runny nose, sore throat, taste is a little different, mouth very day    Protocols used: " CORONAVIRUS (COVID-19) EXPOSURE-A-OH, CORONAVIRUS (COVID-19) DIAGNOSED OR [id number]

## 2020-11-16 NOTE — PROGRESS NOTES
Subjective:     El LANDEROS Renee III is a 54 y.o. male who presents for No chief complaint on file.      Cough        Past Medical History:   Diagnosis Date   • Anxiety    • ASTHMA     seasonal asthma   • Bipolar disorder (HCC)    • Depression    • Hypertension    • Hyponatremia    • IV drug user    • Panic disorder    • Substance abuse (HCC)    • Suicide attempt (HCC)        No past surgical history on file.    Social History     Socioeconomic History   • Marital status:      Spouse name: Not on file   • Number of children: Not on file   • Years of education: Not on file   • Highest education level: Not on file   Occupational History   • Not on file   Social Needs   • Financial resource strain: Not on file   • Food insecurity     Worry: Not on file     Inability: Not on file   • Transportation needs     Medical: Not on file     Non-medical: Not on file   Tobacco Use   • Smoking status: Light Tobacco Smoker   • Smokeless tobacco: Never Used   Substance and Sexual Activity   • Alcohol use: Not on file     Comment: DENIES   • Drug use: Not on file     Comment: history of meth use - several years ago   • Sexual activity: Never     Partners: Female     Comment:  6 years    Lifestyle   • Physical activity     Days per week: Not on file     Minutes per session: Not on file   • Stress: Not on file   Relationships   • Social connections     Talks on phone: Not on file     Gets together: Not on file     Attends Orthodox service: Not on file     Active member of club or organization: Not on file     Attends meetings of clubs or organizations: Not on file     Relationship status: Not on file   • Intimate partner violence     Fear of current or ex partner: Not on file     Emotionally abused: Not on file     Physically abused: Not on file     Forced sexual activity: Not on file   Other Topics Concern   • Not on file   Social History Narrative    ** Merged History Encounter **             Family History   Problem  Relation Age of Onset   • Heart Disease Father         MI at 59   • Diabetes Maternal Grandmother    • Hypertension Maternal Grandmother    • Cancer Paternal Grandfather         brain tumor    • Suicide Attempts Maternal Uncle    • Suicide Attempts Cousin         Allergies   Allergen Reactions   • Benadryl [Diphenhydramine] Anxiety   • Codeine Hives       ROS     Objective:   There were no vitals taken for this visit.    Physical Exam    Assessment/Plan:   There are no diagnoses linked to this encounter.    Differential diagnosis, natural history, supportive care, and indications for immediate follow-up discussed.

## 2020-11-17 LAB
COVID ORDER STATUS COVID19: NORMAL
SARS-COV-2 RNA RESP QL NAA+PROBE: DETECTED
SPECIMEN SOURCE: ABNORMAL

## 2020-11-18 ENCOUNTER — TELEPHONE (OUTPATIENT)
Dept: URGENT CARE | Facility: CLINIC | Age: 55
End: 2020-11-18

## 2020-12-07 ENCOUNTER — HOSPITAL ENCOUNTER (EMERGENCY)
Facility: MEDICAL CENTER | Age: 55
End: 2020-12-07
Attending: EMERGENCY MEDICINE
Payer: COMMERCIAL

## 2020-12-07 VITALS
OXYGEN SATURATION: 96 % | WEIGHT: 191.8 LBS | BODY MASS INDEX: 28.41 KG/M2 | SYSTOLIC BLOOD PRESSURE: 142 MMHG | RESPIRATION RATE: 18 BRPM | HEIGHT: 69 IN | DIASTOLIC BLOOD PRESSURE: 98 MMHG | TEMPERATURE: 97.5 F | HEART RATE: 73 BPM

## 2020-12-07 DIAGNOSIS — L03.012 PARONYCHIA OF LEFT THUMB: ICD-10-CM

## 2020-12-07 PROCEDURE — 303977 HCHG I & D

## 2020-12-07 PROCEDURE — 99284 EMERGENCY DEPT VISIT MOD MDM: CPT

## 2020-12-07 PROCEDURE — 700101 HCHG RX REV CODE 250: Performed by: EMERGENCY MEDICINE

## 2020-12-07 RX ORDER — CEPHALEXIN 500 MG/1
1000 CAPSULE ORAL 2 TIMES DAILY
Qty: 28 CAP | Refills: 0 | Status: SHIPPED | OUTPATIENT
Start: 2020-12-07 | End: 2020-12-14

## 2020-12-07 RX ORDER — LIDOCAINE HYDROCHLORIDE 10 MG/ML
20 INJECTION, SOLUTION INFILTRATION; PERINEURAL ONCE
Status: COMPLETED | OUTPATIENT
Start: 2020-12-07 | End: 2020-12-07

## 2020-12-07 RX ORDER — SULFAMETHOXAZOLE AND TRIMETHOPRIM 800; 160 MG/1; MG/1
1 TABLET ORAL 2 TIMES DAILY
Qty: 14 TAB | Refills: 0 | Status: SHIPPED | OUTPATIENT
Start: 2020-12-07 | End: 2020-12-14

## 2020-12-07 RX ADMIN — LIDOCAINE HYDROCHLORIDE 20 ML: 10 INJECTION, SOLUTION INFILTRATION; PERINEURAL at 06:45

## 2020-12-07 ASSESSMENT — FIBROSIS 4 INDEX: FIB4 SCORE: 1.1

## 2020-12-07 NOTE — ED PROVIDER NOTES
ED Provider Note    Scribed for Rene Saez M.D. by Pardeep Kelley. 12/7/2020, 6:24 AM.    Primary care provider: Pcp Pt States None  Means of arrival: Private Vehicle  History obtained from: Patient  History limited by: None    CHIEF COMPLAINT  Chief Complaint   Patient presents with   • Hand Pain     L Thumb       HPI  El Duran III is a 55 y.o. male who presents to the Emergency Department for evaluation of mild left thumb swelling onset 4 days. He admits to additional symptoms of moderate left thumb pain (quality of pressure), and green coloration at the base of the left thumb nail bed, but denies fever, vomiting. He notes he tried to otero the green colored area, but was unable to express any discharge. He says he has soaked his thumb in salt water 3 times daily with no alleviation. He has history of diabetes. He denies history of diabetic infections to the hands or feet.     PPE Note: I personally donned full PPE for all patient encounters during this visit, including being clean-shaven with an N95 respirator mask, gloves, gown, and goggles.     Scribe remained outside the patient's room and did not have any contact with the patient for the duration of patient encounter.      REVIEW OF SYSTEMS  Pertinent positives include left thumb swelling, left thumb pain (quality of pressure), and green coloration at the base of the left thumb nail bed.   Pertinent negatives include no fever, vomiting.    See HPI for further details.    PAST MEDICAL HISTORY   has a past medical history of Anxiety, ASTHMA, Bipolar disorder ([facility]), Depression, Hypertension, Hyponatremia, IV drug user, Panic disorder, Substance abuse ([facility]), and Suicide attempt ([facility]).    SURGICAL HISTORY  patient denies any surgical history    SOCIAL HISTORY  Social History     Tobacco Use   • Smoking status: Light Tobacco Smoker   • Smokeless tobacco: Never Used   Substance Use Topics   • Alcohol use: Not on file     Comment: DENIES   • Drug use: Not on  "file     Comment: history of meth use - several years ago      Social History     Substance and Sexual Activity   Drug Use Not on file    Comment: history of meth use - several years ago       FAMILY HISTORY  Family History   Problem Relation Age of Onset   • Heart Disease Father         MI at 59   • Diabetes Maternal Grandmother    • Hypertension Maternal Grandmother    • Cancer Paternal Grandfather         brain tumor    • Suicide Attempts Maternal Uncle    • Suicide Attempts Cousin        CURRENT MEDICATIONS  Current Outpatient Medications   Medication Instructions   • albuterol (VENTOLIN OR PROVENTIL) 108 (90 BASE) MCG/ACT AERS 1 Puff, EVERY 6 HOURS PRN   • aspirin EC (ECOTRIN) 81 mg, Oral, DAILY   • atorvastatin (LIPITOR) 40 mg, Oral, NIGHTLY   • BIOTIN PO 1 Tab, Oral, DAILY   • Cyanocobalamin (B-12 PO) 1 Tab, Oral, DAILY   • escitalopram (LEXAPRO) 20 mg, Oral, DAILY   • Flaxseed, Linseed, (FLAXSEED OIL PO) 1 Cap, Oral, DAILY   • hydroCHLOROthiazide (HYDRODIURIL) 12.5 mg, Oral, DAILY   • lisinopril (PRINIVIL) 40 mg, Oral, DAILY   • metformin (GLUCOPHAGE) 1,000 mg, Oral, 2 TIMES DAILY WITH MEALS   • methylPREDNISolone (MEDROL DOSEPAK) 4 MG Tablet Therapy Pack Follow schedule on package instructions.   • Multiple Vitamins-Minerals (MULTI FOR HIM 50+ PO) 1 Tab, Oral, DAILY   • Omega-3 Fatty Acids (OMEGA 3 PO) 1 Tab, Oral, DAILY   • OXcarbazepine (TRILEPTAL) 1,200 mg, Oral, EVERY BEDTIME, Pt reports that he takes 4 tablets at night   • Potassium 99 mg, Oral, DAILY   • pramipexole (MIRAPEX) 1 mg, Oral, EVERY BEDTIME   • QUEtiapine (SEROQUEL) 50 mg, Oral, PRN     ALLERGIES  Allergies   Allergen Reactions   • Benadryl [Diphenhydramine] Anxiety   • Codeine Hives       PHYSICAL EXAM  VITAL SIGNS: /90   Pulse 77   Temp 36.4 °C (97.5 °F) (Temporal)   Resp 18   Ht 1.753 m (5' 9\")   Wt 87 kg (191 lb 12.8 oz)   SpO2 99%   BMI 28.32 kg/m²     Constitutional: Well developed, Well nourished, No acute distress, " Non-toxic appearance.   HENT: Normocephalic, Atraumatic  Cardiovascular: Regular pulse  Lungs: No respiratory distress  Skin: Warm, Dry, no rash  Extremities: Soft tissue swelling and fluctuance along the ulnar aspect of the left thumb nail, No evidence of felon, good capillary refill, flection intact, no redness to the hand.   Neurologic: Alert, appropriate, follows commands  Psychiatric: Affect normal    DIAGNOSTIC STUDIES / PROCEDURES    Incision and Drainage Procedure Note    Indication: Paronychia    Procedure: The patient was positioned appropriately and the skin over the incision site was prepped with betadine and draped in a sterile fashion. Local anesthesia was obtained by infiltration using 1% Lidocaine without epinephrine.  Undermined the nail to determine if there was any pus. An incision was then made over the greatest area of fluctuance and a moderate amount of purulent material was expressed. The drainage cavity was then dressed with a sterile dressing.     The patient tolerated the procedure well.    Complications: None    COURSE & MEDICAL DECISION MAKING  Nursing notes, VS, PMSFHx reviewed in chart.     6:24 AM Patient seen and examined at bedside. I informed the patient of my plan to perform and Incision and Drainage procedure. Patient verbalizes understanding and support with my plan of care.      6:49 AM - Incision and Drainage procedure perfromed by me. I discussed plan for discharge and follow up as outlined below. The patient verbalizes they feel comfortable going home. The patient is stable for discharge at this time and will return for any new or worsening symptoms. Patient verbalizes understanding and support with my plan for discharge.      Decision Making:  This is a 55 y.o. year old male who presents with what appears to be paronychia of his left thumb.  He had this opened up here and it drained a moderate amount of pus.  He will be laced on Bactrim and Keflex and will be advised to  perform warm soaks.  Notably he is a diabetic.  Patient will be advised to follow-up with his primary doctor for recheck.     The patient will return for new or worsening symptoms and is stable at the time of discharge.    DISPOSITION:  Patient will be discharged home in stable condition.    FOLLOW UP:  [address] [address]  [phone]  Schedule an appointment as soon as possible for a visit today        OUTPATIENT MEDICATIONS:  New Prescriptions    CEPHALEXIN (KEFLEX) 500 MG CAP    Take 2 Caps by mouth 2 times a day for 7 days.    SULFAMETHOXAZOLE-TRIMETHOPRIM (BACTRIM DS) 800-160 MG TABLET    Take 1 Tab by mouth 2 times a day for 7 days.     FINAL IMPRESSION  1. Paronychia of left thumb          Pardeep LYLES (Irina), am scribing for, and in the presence of, Rene Saez M.D..    Electronically signed by: Pardeep Kelley (Irina), 12/7/2020    Rene LYLES M.D. personally performed the services described in this documentation, as scribed by Pardeep Kelley in my presence, and it is both accurate and complete.    The note accurately reflects work and decisions made by me.  Rene Saez M.D.  12/7/2020  6:51 AM

## 2020-12-07 NOTE — DISCHARGE INSTRUCTIONS
Warm soapy water soaks.  Return for increased swelling redness or other concerns.  Please take your antibiotics as written.

## 2020-12-07 NOTE — ED TRIAGE NOTES
"Chief Complaint   Patient presents with   • Hand Pain     L Thumb     Pt walked into triage with a steady gait c/o pain and \"greenish\" discharge from the nail base on his L thumb that started on thurs.  Denies any injury. Pt tested positive for Covid 11/16    Pt & staff masked and in appropriate PPE during encounter.  Pt denies fever/travel or being in contact with anyone testing positive for Covid.  Explained pt the triage process, made pt aware to tell the RN/staff of any changes/concerns, pt verbalized understanding of process and instructions given.  Pt to ER lobby.    "

## 2020-12-07 NOTE — ED NOTES
Pt aox4, skin pink warm and dry, airway patent, rr even and unlabored, nad noted. Pms intact to all extremities, no new complaints. Ambulates upon discharge without new incident or distress.

## 2021-03-15 DIAGNOSIS — Z23 NEED FOR VACCINATION: ICD-10-CM

## 2023-05-28 ENCOUNTER — HOSPITAL ENCOUNTER (EMERGENCY)
Facility: MEDICAL CENTER | Age: 58
End: 2023-05-28
Attending: STUDENT IN AN ORGANIZED HEALTH CARE EDUCATION/TRAINING PROGRAM
Payer: MEDICAID

## 2023-05-28 ENCOUNTER — APPOINTMENT (OUTPATIENT)
Dept: RADIOLOGY | Facility: MEDICAL CENTER | Age: 58
End: 2023-05-28
Attending: STUDENT IN AN ORGANIZED HEALTH CARE EDUCATION/TRAINING PROGRAM
Payer: MEDICAID

## 2023-05-28 VITALS
OXYGEN SATURATION: 88 % | HEART RATE: 68 BPM | HEIGHT: 69 IN | WEIGHT: 191 LBS | BODY MASS INDEX: 28.29 KG/M2 | DIASTOLIC BLOOD PRESSURE: 70 MMHG | SYSTOLIC BLOOD PRESSURE: 106 MMHG | RESPIRATION RATE: 18 BRPM | TEMPERATURE: 98.9 F

## 2023-05-28 DIAGNOSIS — H02.846 EDEMA OF LEFT EYELID: ICD-10-CM

## 2023-05-28 DIAGNOSIS — S09.90XA CLOSED HEAD INJURY, INITIAL ENCOUNTER: ICD-10-CM

## 2023-05-28 DIAGNOSIS — S00.83XA CONTUSION OF FACE, INITIAL ENCOUNTER: ICD-10-CM

## 2023-05-28 DIAGNOSIS — S06.0X1A CONCUSSION WITH LOSS OF CONSCIOUSNESS OF 30 MINUTES OR LESS, INITIAL ENCOUNTER: ICD-10-CM

## 2023-05-28 PROCEDURE — 700111 HCHG RX REV CODE 636 W/ 250 OVERRIDE (IP): Mod: UD | Performed by: STUDENT IN AN ORGANIZED HEALTH CARE EDUCATION/TRAINING PROGRAM

## 2023-05-28 PROCEDURE — 70450 CT HEAD/BRAIN W/O DYE: CPT

## 2023-05-28 PROCEDURE — 96372 THER/PROPH/DIAG INJ SC/IM: CPT

## 2023-05-28 PROCEDURE — 700102 HCHG RX REV CODE 250 W/ 637 OVERRIDE(OP): Mod: UD | Performed by: STUDENT IN AN ORGANIZED HEALTH CARE EDUCATION/TRAINING PROGRAM

## 2023-05-28 PROCEDURE — A9270 NON-COVERED ITEM OR SERVICE: HCPCS | Mod: UD | Performed by: STUDENT IN AN ORGANIZED HEALTH CARE EDUCATION/TRAINING PROGRAM

## 2023-05-28 PROCEDURE — 99285 EMERGENCY DEPT VISIT HI MDM: CPT

## 2023-05-28 PROCEDURE — 70486 CT MAXILLOFACIAL W/O DYE: CPT

## 2023-05-28 PROCEDURE — 72125 CT NECK SPINE W/O DYE: CPT

## 2023-05-28 RX ORDER — KETAMINE HYDROCHLORIDE 50 MG/ML
300 INJECTION, SOLUTION INTRAMUSCULAR; INTRAVENOUS ONCE
Status: DISCONTINUED | OUTPATIENT
Start: 2023-05-28 | End: 2023-05-28

## 2023-05-28 RX ORDER — ACETAMINOPHEN 500 MG
1000 TABLET ORAL ONCE
Status: COMPLETED | OUTPATIENT
Start: 2023-05-29 | End: 2023-05-28

## 2023-05-28 RX ORDER — MIDAZOLAM HYDROCHLORIDE 5 MG/ML
5 INJECTION INTRAMUSCULAR; INTRAVENOUS ONCE
Status: COMPLETED | OUTPATIENT
Start: 2023-05-28 | End: 2023-05-28

## 2023-05-28 RX ORDER — HALOPERIDOL 5 MG/ML
5 INJECTION INTRAMUSCULAR ONCE
Status: DISCONTINUED | OUTPATIENT
Start: 2023-05-28 | End: 2023-05-28

## 2023-05-28 RX ORDER — LORAZEPAM 2 MG/ML
2 INJECTION INTRAMUSCULAR ONCE
Status: DISCONTINUED | OUTPATIENT
Start: 2023-05-28 | End: 2023-05-28

## 2023-05-28 RX ADMIN — MIDAZOLAM HYDROCHLORIDE 5 MG: 5 INJECTION, SOLUTION INTRAMUSCULAR; INTRAVENOUS at 19:37

## 2023-05-28 RX ADMIN — ACETAMINOPHEN 1000 MG: 500 TABLET, FILM COATED ORAL at 23:38

## 2023-05-28 RX ADMIN — MIDAZOLAM HYDROCHLORIDE 5 MG: 5 INJECTION, SOLUTION INTRAMUSCULAR; INTRAVENOUS at 21:02

## 2023-05-28 ASSESSMENT — LIFESTYLE VARIABLES: DO YOU DRINK ALCOHOL: YES

## 2023-05-29 NOTE — ED NOTES
Pt medicated per MAR   Patient provided with discharge instructions. Education complete, all questions answered.   vitals stable. All personal belongings accounted for.   Patient ambulated out of the ER with family.
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
## 2023-05-29 NOTE — ED NOTES
Pt agreeable to behavior requirements. Restraints discontinued. Pt ambulated around unit and ate a cracker.
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
## 2023-05-29 NOTE — ED NOTES
Pt wife back at bedside.  Pt yelling at wife.    Wife left room.  ERP to bedside and spoke to pt wife.   Attempting to calm pt to get CT completed, pt agrees to have CT completed.
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
## 2023-05-29 NOTE — ED NOTES
Received bedside report from BLAIR Jacobs. Assumed care of patient. Security at bedside, pt to CT with security

## 2023-05-29 NOTE — ED NOTES
Security at bedside and attempting to give pt IM medication.  Pt reports allergy to Haldol, informing ERP.
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
## 2023-05-29 NOTE — ED NOTES
Pt took clothes off for security but refusing to place gown on.  Pt sitting in gurney in boxers.      Able to assess vitals,  security remain at bedside.
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
## 2024-04-03 ENCOUNTER — HOSPITAL ENCOUNTER (EMERGENCY)
Facility: MEDICAL CENTER | Age: 59
End: 2024-04-03
Attending: EMERGENCY MEDICINE
Payer: COMMERCIAL

## 2024-04-03 ENCOUNTER — APPOINTMENT (OUTPATIENT)
Dept: RADIOLOGY | Facility: MEDICAL CENTER | Age: 59
End: 2024-04-03
Attending: EMERGENCY MEDICINE
Payer: COMMERCIAL

## 2024-04-03 VITALS
SYSTOLIC BLOOD PRESSURE: 162 MMHG | HEIGHT: 68 IN | OXYGEN SATURATION: 95 % | DIASTOLIC BLOOD PRESSURE: 95 MMHG | HEART RATE: 122 BPM | BODY MASS INDEX: 28.04 KG/M2 | RESPIRATION RATE: 19 BRPM | TEMPERATURE: 97.2 F | WEIGHT: 185 LBS

## 2024-04-03 DIAGNOSIS — S20.219A CONTUSION OF CHEST WALL, UNSPECIFIED LATERALITY, INITIAL ENCOUNTER: ICD-10-CM
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
## 2024-04-03 PROCEDURE — 99284 EMERGENCY DEPT VISIT MOD MDM: CPT

## 2024-04-03 NOTE — DISCHARGE INSTRUCTIONS
You are medically cleared for incarceration.    You have declined the chest x-ray.  Report to the local syncope if you develop shortness of breath or any trouble breathing.    Use Motrin Tylenol for discomfort
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
## 2024-04-03 NOTE — ED NOTES
Pt provided discharge instructions and follow-up information. Pt verbalized understanding and all questions answered. Pt discharged in PD custody.
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
## 2024-06-27 ENCOUNTER — APPOINTMENT (OUTPATIENT)
Dept: URGENT CARE | Facility: CLINIC | Age: 59
End: 2024-06-27

## 2024-06-27 ENCOUNTER — NON-PROVIDER VISIT (OUTPATIENT)
Dept: OCCUPATIONAL MEDICINE | Facility: CLINIC | Age: 59
End: 2024-06-27

## 2024-06-27 DIAGNOSIS — Z11.1 ENCOUNTER FOR PPD TEST: Primary | ICD-10-CM

## 2024-06-30 ENCOUNTER — NON-PROVIDER VISIT (OUTPATIENT)
Dept: URGENT CARE | Facility: CLINIC | Age: 59
End: 2024-06-30

## 2024-06-30 LAB — TB WHEAL 3D P 5 TU DIAM: 0 MM

## 2024-06-30 NOTE — NON-PROVIDER
El Andrade Renee III is a 58 y.o. male here for a non-provider visit for PPD reading -- Step 1 of 1.      1.  Resulted in Epic under enter/edit results? Yes   2.  TB evaluation questionnaire scanned into chart and original given to patient?Yes      3. Was induration greater than 0 mm? No.    If Step 1 of 2, when is patient returning for second step (delete if N/A): NA    Routed to PCP? No

## 2025-01-11 ENCOUNTER — HOSPITAL ENCOUNTER (EMERGENCY)
Facility: MEDICAL CENTER | Age: 60
End: 2025-01-11
Attending: STUDENT IN AN ORGANIZED HEALTH CARE EDUCATION/TRAINING PROGRAM
Payer: MEDICAID

## 2025-01-11 VITALS
BODY MASS INDEX: 27.79 KG/M2 | RESPIRATION RATE: 18 BRPM | TEMPERATURE: 97.9 F | DIASTOLIC BLOOD PRESSURE: 69 MMHG | HEIGHT: 69 IN | HEART RATE: 100 BPM | OXYGEN SATURATION: 93 % | SYSTOLIC BLOOD PRESSURE: 129 MMHG | WEIGHT: 187.61 LBS

## 2025-01-11 DIAGNOSIS — G47.00 INSOMNIA, UNSPECIFIED TYPE: ICD-10-CM

## 2025-01-11 DIAGNOSIS — R73.9 HYPERGLYCEMIA: ICD-10-CM

## 2025-01-11 LAB
ALBUMIN SERPL BCP-MCNC: 3.8 G/DL (ref 3.2–4.9)
ALBUMIN/GLOB SERPL: 1.4 G/DL
ALP SERPL-CCNC: 171 U/L (ref 30–99)
ALT SERPL-CCNC: 25 U/L (ref 2–50)
ANION GAP SERPL CALC-SCNC: 11 MMOL/L (ref 7–16)
AST SERPL-CCNC: 24 U/L (ref 12–45)
BILIRUB SERPL-MCNC: <0.2 MG/DL (ref 0.1–1.5)
BUN SERPL-MCNC: 29 MG/DL (ref 8–22)
CALCIUM ALBUM COR SERPL-MCNC: 9.2 MG/DL (ref 8.5–10.5)
CALCIUM SERPL-MCNC: 9 MG/DL (ref 8.5–10.5)
CHLORIDE SERPL-SCNC: 93 MMOL/L (ref 96–112)
CO2 SERPL-SCNC: 21 MMOL/L (ref 20–33)
CREAT SERPL-MCNC: 0.8 MG/DL (ref 0.5–1.4)
EKG IMPRESSION: NORMAL
ETHANOL BLD-MCNC: <10.1 MG/DL
GFR SERPLBLD CREATININE-BSD FMLA CKD-EPI: 101 ML/MIN/1.73 M 2
GLOBULIN SER CALC-MCNC: 2.7 G/DL (ref 1.9–3.5)
GLUCOSE BLD STRIP.AUTO-MCNC: 411 MG/DL (ref 65–99)
GLUCOSE SERPL-MCNC: 684 MG/DL (ref 65–99)
POTASSIUM SERPL-SCNC: 4.9 MMOL/L (ref 3.6–5.5)
PROT SERPL-MCNC: 6.5 G/DL (ref 6–8.2)
SODIUM SERPL-SCNC: 125 MMOL/L (ref 135–145)

## 2025-01-11 PROCEDURE — 700105 HCHG RX REV CODE 258: Mod: UD | Performed by: STUDENT IN AN ORGANIZED HEALTH CARE EDUCATION/TRAINING PROGRAM

## 2025-01-11 PROCEDURE — 99284 EMERGENCY DEPT VISIT MOD MDM: CPT

## 2025-01-11 PROCEDURE — 80053 COMPREHEN METABOLIC PANEL: CPT

## 2025-01-11 PROCEDURE — 36415 COLL VENOUS BLD VENIPUNCTURE: CPT

## 2025-01-11 PROCEDURE — 82077 ASSAY SPEC XCP UR&BREATH IA: CPT

## 2025-01-11 PROCEDURE — 700111 HCHG RX REV CODE 636 W/ 250 OVERRIDE (IP): Performed by: STUDENT IN AN ORGANIZED HEALTH CARE EDUCATION/TRAINING PROGRAM

## 2025-01-11 PROCEDURE — 93005 ELECTROCARDIOGRAM TRACING: CPT | Mod: TC | Performed by: STUDENT IN AN ORGANIZED HEALTH CARE EDUCATION/TRAINING PROGRAM

## 2025-01-11 PROCEDURE — 82962 GLUCOSE BLOOD TEST: CPT

## 2025-01-11 PROCEDURE — 96374 THER/PROPH/DIAG INJ IV PUSH: CPT

## 2025-01-11 RX ORDER — SODIUM CHLORIDE, SODIUM LACTATE, POTASSIUM CHLORIDE, CALCIUM CHLORIDE 600; 310; 30; 20 MG/100ML; MG/100ML; MG/100ML; MG/100ML
1000 INJECTION, SOLUTION INTRAVENOUS ONCE
Status: COMPLETED | OUTPATIENT
Start: 2025-01-11 | End: 2025-01-11

## 2025-01-11 RX ADMIN — SODIUM CHLORIDE, SODIUM LACTATE, POTASSIUM CHLORIDE, AND CALCIUM CHLORIDE 1000 ML: .6; .31; .03; .02 INJECTION, SOLUTION INTRAVENOUS at 21:54

## 2025-01-11 RX ADMIN — INSULIN HUMAN 5 UNITS: 100 INJECTION, SOLUTION PARENTERAL at 21:50

## 2025-01-12 NOTE — ED PROVIDER NOTES
"      ED Provider Note    CHIEF COMPLAINT  Chief Complaint   Patient presents with    Insomnia     Pt said he cannot sleep for 2 days, he said he cannot stay still, something is going on in his body  He denied taking drugs and alcohol     LIMITATION TO HISTORY   None    HPI    El Duran III is a 59 y.o. male who presents to the Emergency Department for evaluation of restlessness onset two days ago. The patient reports that he has been having difficulty sleeping due to this, stating that after a few seconds he is \"up rocking on the bed\". Denies being able to sleep at all these past two nights. He notes no recent anxiety or stress. Patient adds that he had the flu recently and was later put on antibiotics and Medrol Dosepak, which he completed 6 days ago. He denies any drug, alcohol, or excessive caffeine use, but confirms tobacco use. Confirms a history of diabetes.    OUTSIDE HISTORIAN(S):  None     EXTERNAL RECORDS REVIEWED  Reviewed office visit from 9/9/24 where patient was seen for progressively worsening low back pain and radiculopathy, reporting that he was constantly changing positions in bed.    PAST MEDICAL HISTORY  Past Medical History:   Diagnosis Date    Anxiety     ASTHMA     seasonal asthma    Bipolar disorder (HCC)     Depression     Hypertension     Hyponatremia     IV drug user     Panic disorder     Substance abuse (HCC)     Suicide attempt (HCC)      SURGICAL HISTORY  History reviewed. No pertinent surgical history.    FAMILY HISTORY  Family History   Problem Relation Age of Onset    Heart Disease Father         MI at 59    Diabetes Maternal Grandmother     Hypertension Maternal Grandmother     Cancer Paternal Grandfather         brain tumor     Suicide Attempts Maternal Uncle     Suicide Attempts Cousin       SOCIAL HISTORY  Social History     Socioeconomic History    Marital status:      Spouse name: Not on file    Number of children: Not on file    Years of education: Not on file "    Highest education level: Not on file   Occupational History    Not on file   Tobacco Use    Smoking status: Light Smoker    Smokeless tobacco: Never   Vaping Use    Vaping status: Never Used   Substance and Sexual Activity    Alcohol use: Not Currently     Comment: DENIES    Drug use: Not on file     Comment: history of meth use - several years ago    Sexual activity: Never     Partners: Female     Comment:  6 years    Other Topics Concern    Not on file   Social History Narrative    ** Merged History Encounter **          Social Drivers of Health     Financial Resource Strain: Not on file   Food Insecurity: Not on file   Transportation Needs: Not on file   Physical Activity: Not on file   Stress: Not on file   Social Connections: Not on file   Intimate Partner Violence: Not on file   Housing Stability: Not on file     CURRENT MEDICATIONS  Current Outpatient Medications on File Prior to Encounter   Medication Sig Dispense Refill    methylPREDNISolone (MEDROL DOSEPAK) 4 MG Tablet Therapy Pack Follow schedule on package instructions. 21 Tab 0    atorvastatin (LIPITOR) 40 MG Tab Take 40 mg by mouth every evening.      hydroCHLOROthiazide (HYDRODIURIL) 12.5 MG tablet Take 12.5 mg by mouth every day.      lisinopril (PRINIVIL) 40 MG tablet Take 40 mg by mouth every day.      metformin (GLUCOPHAGE) 1000 MG tablet Take 1,000 mg by mouth 2 times a day, with meals.      pramipexole (MIRAPEX) 1 MG Tab Take 1 mg by mouth every bedtime.      quetiapine (SEROQUEL) 50 MG tablet Take 50 mg by mouth as needed (For sleep).      Potassium 99 MG Tab Take 99 mg by mouth every day.      Flaxseed, Linseed, (FLAXSEED OIL PO) Take 1 Cap by mouth every day.      BIOTIN PO Take 1 Tab by mouth every day.      Cyanocobalamin (B-12 PO) Take 1 Tab by mouth every day.      escitalopram (LEXAPRO) 10 MG Tab Take 20 mg by mouth every day.      OXcarbazepine (TRILEPTAL) 300 MG Tab Take 1,200 mg by mouth every bedtime. Pt reports that he  "takes 4 tablets at night      Omega-3 Fatty Acids (OMEGA 3 PO) Take 1 Tab by mouth every day.      Multiple Vitamins-Minerals (MULTI FOR HIM 50+ PO) Take 1 Tab by mouth every day.      aspirin EC (ECOTRIN) 81 MG Tablet Delayed Response Take 81 mg by mouth every day.      albuterol (VENTOLIN OR PROVENTIL) 108 (90 BASE) MCG/ACT AERS Inhale 1 Puff by mouth every 6 hours as needed.       ALLERGIES  Allergies   Allergen Reactions    Benadryl [Diphenhydramine] Anxiety    Codeine Hives     PHYSICAL EXAM  VITAL SIGNS:BP (!) 109/90   Pulse (!) 110   Temp 36.6 °C (97.9 °F) (Temporal)   Resp 16   Ht 1.753 m (5' 9\")   Wt 85.1 kg (187 lb 9.8 oz)   SpO2 98%   BMI 27.71 kg/m²       GENERAL: Awake and alert. Restless.  HEAD: Normocephalic and atraumatic  NECK: Normal range of motion, without meningismus  EYES: Pupils Equal, Round, Reactive to Light, extraocular movements intact, conjunctiva white  ENT: Mucous membranes moist, oropharynx clear  PULMONARY: Normal effort, clear to auscultation  CARDIOVASCULAR: No murmurs, clicks or rubs, peripheral pulses 2+, Tachycardic.  ABDOMINAL: Soft, non-tender, no guarding or rigidity present, no pulsatile masses  BACK: no midline tenderness, no costovertebral tenderness  NEUROLOGICAL: Grossly non-focal neurological examination, speech normal, gait normal  EXTREMITIES: No edema, normal to inspection  SKIN: Warm and dry.  PSYCHIATRIC: Affect is appropriate    DIAGNOSTIC STUDIES / PROCEDURES  EKG  I have independently interpreted this EKG  Results for orders placed or performed during the hospital encounter of 25   EKG   Result Value Ref Range    Report       [facility] Emergency [facility].    Test Date:  2025  Pt Name:    ROGER FONTAINE                   Department: ER  MRN:        [id number]                      Room:        39  Gender:     Male                         Technician: [id number]  :        1965                   Requested By:ISABELA SNYDER  Order #: "    [id number]                    Reading MD:    Measurements  Intervals                                Axis  Rate:       105                          P:          60  OH:         171                          QRS:        -27  QRSD:       83                           T:          67  QT:         318  QTc:        421    Interpretive Statements  Sinus tachycardia  Borderline left axis deviation  Compared to ECG 09/29/2020 04:19:47  Sinus rhythm no longer present       LABS  Labs Reviewed   COMP METABOLIC PANEL - Abnormal; Notable for the following components:       Result Value    Sodium 125 (*)     Chloride 93 (*)     Glucose 684 (*)     Bun 29 (*)     Alkaline Phosphatase 171 (*)     All other components within normal limits   POCT GLUCOSE DEVICE RESULTS - Abnormal; Notable for the following components:    POC Glucose, Blood 411 (*)     All other components within normal limits   DIAGNOSTIC ALCOHOL   ESTIMATED GFR     All labs reviewed by me.     COURSE & MEDICAL DECISION MAKING    ED COURSE:    INTERVENTIONS BY ME:  Medications   Bolus of LR (0 mL Intravenous Stopped 1/11/25 2240)   insulin regular (HumuLIN R/NovoLIN R) 1 unit/mL syringe (IV ONLY) 5 Units (5 Units Intravenous Given 1/11/25 2150)       Response on recheck:    8:04 PM - Patient seen and examined at bedside. Patient presents to the ED for evaluation of restlessness onset 2 days ago causing difficulty sleeping.  After my exam, I discussed with the patient the plan of care, which includes obtaining lab work for further evaluation. Patient understands and verbalizes agreement to plan of care. Ordered Diagnostic Alcohol and CMP to evaluate.     9:26 PM - Patient was reevaluated at bedside. I informed him that his blood glucose was 684. He confirms being compliant with metformin, taking it twice daily, along with hydroxyzine every morning. Patient notes that his glucose at home is regularly between . He adds that at home he drinks a large amount of water  daily.     11:01 PM - I reevaluated the patient at bedside. I informed the patient that his blood glucose is much improved following insulin administration. I discussed plan for discharge and follow up as outlined below. The patient is stable for discharge at this time and will return for any new or worsening symptoms. Patient verbalizes understanding and support with my plan for discharge.     INITIAL ASSESSMENT, COURSE AND PLAN  Care Narrative:   MDM:  The patient is a 59-year-old male presenting with two days of restlessness and severe insomnia, prompting difficulty sleeping. He denies recent stress, alcohol, or drug use but reports a history of bipolar disorder and prior methamphetamine use. He recently completed a Medrol Dosepak for flu symptoms. Upon evaluation, the patient was found to have severe hyperglycemia (initial glucose 684 mg/dL) and hyponatremia (sodium 125 mEq/L). His symptoms are consistent with hyperglycemia-related agitation and possible early hyperglycemic hyperosmolar syndrome (HHS), though he remained hemodynamically stable.    Further examination revealed sinus tachycardia and mild dehydration, but no signs of infection or acute end-organ damage. Labs showed elevated blood urea nitrogen (BUN), mild hyperchloremia, and a normal anion gap, consistent with hyperglycemia-related fluid shifts. EKG findings of sinus tachycardia and borderline left-axis deviation were stable compared to prior studies.    The patient was managed with intravenous insulin, resulting in significant improvement in blood glucose levels (411 mg/dL at discharge). He was rehydrated with IV fluids, but his underlying sodium derangement requires continued monitoring and correction as an outpatient. The patient reported improved restlessness following treatment and was stable for discharge with instructions to follow up with his primary care provider for close diabetes management and electrolyte monitoring.    ADDITIONAL  PROBLEM LIST      DISPOSITION AND DISCUSSIONS  Discussion of management with other [facility] or appropriate source(s): None    I have discussed management of the patient with the following physicians and GRISEL's: None    Escalation of care considered, and ultimately not performed:acute inpatient care management, however at this time, the patient is most appropriate for outpatient management patient requesting to be discharged     Barriers to care at this time, including but not limited to:  No known barriers to care .     Decision tools and prescription drugs considered including, but not limited to:     The patient will return for new or worsening symptoms and is stable at the time of discharge.    The patient is referred to a primary physician for blood pressure management, diabetic screening, and for all other preventative health concerns.    DISPOSITION:  Patient will be discharged home in stable condition.    FOLLOW UP:  No follow-up provider specified.    OUTPATIENT MEDICATIONS:  Discharge Medication List as of 1/11/2025 10:58 PM        FINAL DIAGNOSIS  1. Insomnia, unspecified type    2. Hyperglycemia      Hebert LYLES (Doloresibishaan), am scribing for, and in the presence of, Dandre Menchaca.    Electronically signed by: Hebert King (Irina), 1/11/2025    IDandre personally performed the services described in this documentation, as scribed by Hebert King in my presence, and it is both accurate and complete.     Electronically signed by: Dandre Menchaca DO ,11:07 PM 01/11/25   Gait Disorder

## 2025-01-12 NOTE — ED TRIAGE NOTES
Chief Complaint   Patient presents with    Insomnia     Pt said he cannot sleep for 2 days, he said he cannot stay still, something is going on in his body  He denied taking drugs and alcohol     Pain:  0/10  Ambulatory:  Yes  Alert and Oriented: x 4  Oxygen Treatment: No    Pt came in to triage for the above complaints.     Pt is speaking in full sentences, follows commands and responds appropriately to questions.     Respirations are even and unlabored.    Pt placed in lobby. Pt educated on triage process.     Pt encouraged to inform staff for any changes in condition or if needs help while waiting to be room in.      Vitals:    01/11/25 1910   BP: (!) 109/90   Pulse: (!) 110   Resp: 16   Temp: 36.6 °C (97.9 °F)   SpO2: 98%

## 2025-03-06 ENCOUNTER — APPOINTMENT (OUTPATIENT)
Dept: RADIOLOGY | Facility: MEDICAL CENTER | Age: 60
End: 2025-03-06
Attending: EMERGENCY MEDICINE
Payer: MEDICAID

## 2025-03-06 ENCOUNTER — HOSPITAL ENCOUNTER (EMERGENCY)
Facility: MEDICAL CENTER | Age: 60
End: 2025-03-06
Attending: EMERGENCY MEDICINE
Payer: MEDICAID

## 2025-03-06 VITALS
HEART RATE: 81 BPM | WEIGHT: 185 LBS | HEIGHT: 69 IN | SYSTOLIC BLOOD PRESSURE: 134 MMHG | OXYGEN SATURATION: 94 % | TEMPERATURE: 97.1 F | DIASTOLIC BLOOD PRESSURE: 94 MMHG | RESPIRATION RATE: 18 BRPM | BODY MASS INDEX: 27.4 KG/M2

## 2025-03-06 DIAGNOSIS — H53.8 BLURRED VISION: ICD-10-CM

## 2025-03-06 DIAGNOSIS — R73.9 HYPERGLYCEMIA: ICD-10-CM

## 2025-03-06 DIAGNOSIS — R27.0 ATAXIA: ICD-10-CM

## 2025-03-06 DIAGNOSIS — R47.81 SLURRED SPEECH: ICD-10-CM

## 2025-03-06 LAB
ALBUMIN SERPL BCP-MCNC: 4 G/DL (ref 3.2–4.9)
ALBUMIN/GLOB SERPL: 1.3 G/DL
ALP SERPL-CCNC: 98 U/L (ref 30–99)
ALT SERPL-CCNC: 22 U/L (ref 2–50)
ANION GAP SERPL CALC-SCNC: 12 MMOL/L (ref 7–16)
APPEARANCE UR: CLEAR
APTT PPP: 28 SEC (ref 24.7–36)
AST SERPL-CCNC: 30 U/L (ref 12–45)
BASOPHILS # BLD AUTO: 1.6 % (ref 0–1.8)
BASOPHILS # BLD: 0.12 K/UL (ref 0–0.12)
BILIRUB SERPL-MCNC: 0.3 MG/DL (ref 0.1–1.5)
BILIRUB UR QL STRIP.AUTO: NEGATIVE
BUN SERPL-MCNC: 15 MG/DL (ref 8–22)
CALCIUM ALBUM COR SERPL-MCNC: 9.4 MG/DL (ref 8.5–10.5)
CALCIUM SERPL-MCNC: 9.4 MG/DL (ref 8.5–10.5)
CHLORIDE SERPL-SCNC: 101 MMOL/L (ref 96–112)
CO2 SERPL-SCNC: 23 MMOL/L (ref 20–33)
COLOR UR: YELLOW
CREAT SERPL-MCNC: 0.71 MG/DL (ref 0.5–1.4)
EKG IMPRESSION: NORMAL
EOSINOPHIL # BLD AUTO: 0.29 K/UL (ref 0–0.51)
EOSINOPHIL NFR BLD: 3.8 % (ref 0–6.9)
ERYTHROCYTE [DISTWIDTH] IN BLOOD BY AUTOMATED COUNT: 43.8 FL (ref 35.9–50)
FLUAV RNA SPEC QL NAA+PROBE: NEGATIVE
FLUBV RNA SPEC QL NAA+PROBE: NEGATIVE
GFR SERPLBLD CREATININE-BSD FMLA CKD-EPI: 104 ML/MIN/1.73 M 2
GLOBULIN SER CALC-MCNC: 3.1 G/DL (ref 1.9–3.5)
GLUCOSE BLD STRIP.AUTO-MCNC: 295 MG/DL (ref 65–99)
GLUCOSE SERPL-MCNC: 239 MG/DL (ref 65–99)
GLUCOSE UR STRIP.AUTO-MCNC: >=1000 MG/DL
HCT VFR BLD AUTO: 42.4 % (ref 42–52)
HGB BLD-MCNC: 14.2 G/DL (ref 14–18)
IMM GRANULOCYTES # BLD AUTO: 0.03 K/UL (ref 0–0.11)
IMM GRANULOCYTES NFR BLD AUTO: 0.4 % (ref 0–0.9)
INR PPP: 0.86 (ref 0.87–1.13)
KETONES UR STRIP.AUTO-MCNC: NEGATIVE MG/DL
LEUKOCYTE ESTERASE UR QL STRIP.AUTO: NEGATIVE
LYMPHOCYTES # BLD AUTO: 1.73 K/UL (ref 1–4.8)
LYMPHOCYTES NFR BLD: 22.9 % (ref 22–41)
MCH RBC QN AUTO: 30 PG (ref 27–33)
MCHC RBC AUTO-ENTMCNC: 33.5 G/DL (ref 32.3–36.5)
MCV RBC AUTO: 89.6 FL (ref 81.4–97.8)
MICRO URNS: ABNORMAL
MONOCYTES # BLD AUTO: 0.75 K/UL (ref 0–0.85)
MONOCYTES NFR BLD AUTO: 9.9 % (ref 0–13.4)
NEUTROPHILS # BLD AUTO: 4.63 K/UL (ref 1.82–7.42)
NEUTROPHILS NFR BLD: 61.4 % (ref 44–72)
NITRITE UR QL STRIP.AUTO: NEGATIVE
NRBC # BLD AUTO: 0 K/UL
NRBC BLD-RTO: 0 /100 WBC (ref 0–0.2)
PH UR STRIP.AUTO: 5.5 [PH] (ref 5–8)
PLATELET # BLD AUTO: 244 K/UL (ref 164–446)
PMV BLD AUTO: 10.9 FL (ref 9–12.9)
POTASSIUM SERPL-SCNC: 4.2 MMOL/L (ref 3.6–5.5)
PROT SERPL-MCNC: 7.1 G/DL (ref 6–8.2)
PROT UR QL STRIP: NEGATIVE MG/DL
PROTHROMBIN TIME: 11.7 SEC (ref 12–14.6)
RBC # BLD AUTO: 4.73 M/UL (ref 4.7–6.1)
RBC UR QL AUTO: NEGATIVE
RSV RNA SPEC QL NAA+PROBE: NEGATIVE
SARS-COV-2 RNA RESP QL NAA+PROBE: NOTDETECTED
SODIUM SERPL-SCNC: 136 MMOL/L (ref 135–145)
SP GR UR STRIP.AUTO: 1.04
UROBILINOGEN UR STRIP.AUTO-MCNC: 0.2 EU/DL
WBC # BLD AUTO: 7.6 K/UL (ref 4.8–10.8)

## 2025-03-06 PROCEDURE — 85730 THROMBOPLASTIN TIME PARTIAL: CPT

## 2025-03-06 PROCEDURE — 93005 ELECTROCARDIOGRAM TRACING: CPT | Mod: TC

## 2025-03-06 PROCEDURE — 85025 COMPLETE CBC W/AUTO DIFF WBC: CPT

## 2025-03-06 PROCEDURE — 80053 COMPREHEN METABOLIC PANEL: CPT

## 2025-03-06 PROCEDURE — 82962 GLUCOSE BLOOD TEST: CPT

## 2025-03-06 PROCEDURE — 99284 EMERGENCY DEPT VISIT MOD MDM: CPT

## 2025-03-06 PROCEDURE — 93005 ELECTROCARDIOGRAM TRACING: CPT | Mod: TC | Performed by: EMERGENCY MEDICINE

## 2025-03-06 PROCEDURE — 36415 COLL VENOUS BLD VENIPUNCTURE: CPT

## 2025-03-06 PROCEDURE — 94760 N-INVAS EAR/PLS OXIMETRY 1: CPT

## 2025-03-06 PROCEDURE — 70450 CT HEAD/BRAIN W/O DYE: CPT

## 2025-03-06 PROCEDURE — 85610 PROTHROMBIN TIME: CPT

## 2025-03-06 PROCEDURE — 81003 URINALYSIS AUTO W/O SCOPE: CPT

## 2025-03-06 PROCEDURE — 0241U HCHG SARS-COV-2 COVID-19 NFCT DS RESP RNA 4 TRGT ED POC: CPT

## 2025-03-06 NOTE — ED NOTES
Pt ambulatory to BR with steady gait, urine sample collected and sent to lab.   Viral swab running.

## 2025-03-06 NOTE — ED PROVIDER NOTES
"ED Provider Note    CHIEF COMPLAINT  Chief Complaint   Patient presents with    Blurred Vision     Pt reports dizziness / blurry vision to both eyes x 8 days, + R sided HA. Pt was unable to get into PCP therefore came to ED.   Pt also reports intermittent slurred speech x2 days. Pt states he \"feels drunk\" when he tries to talk.   H/o DM, HTN, HLD.       EXTERNAL RECORDS REVIEWED  Other ED evaluation 11/11/2025 for insomnia.  Recent influenza.  Medrol Dosepak use.  Hyperglycemia without DKA.  Confirms compliance with metformin.  Managed with intravenous insulin, IV fluid hydration and improved before discharge.    HPI/ROS  LIMITATION TO HISTORY   Select: : None  OUTSIDE HISTORIAN(S):  None    El Andrade June III is a 59 y.o. male who presents to the emergency department through triage for dizziness, blurred vision, slurred speech and difficulty walking.  Patient states since last week he has had intermittent blurry, double vision.  When he speaks he feels as though he has been slurring his words, but denies word finding difficulty.  When he walks he feels as though \"I am drunk but I have not had a drink in more than 2 years because I do not like that stuff.\"  Some malaise and fatigue as well, increased over the last couple of days.  No extremity weakness or paresthesias.  No fall or trauma.  No chest pain, shortness of breath, palpitations or syncope.  No vomiting.  Denies history of similar recurrent symptoms.    Diabetic, compliant with medications    PAST MEDICAL HISTORY   has a past medical history of Anxiety, ASTHMA, Bipolar disorder (HCC), Depression, Hypertension, Hyponatremia, IV drug user, Panic disorder, Substance abuse ([facility]), and Suicide attempt ([facility]).    SURGICAL HISTORY  patient denies any surgical history    FAMILY HISTORY  Family History   Problem Relation Age of Onset    Heart Disease Father         MI at 59    Diabetes Maternal Grandmother     Hypertension Maternal Grandmother     Cancer Paternal " "Grandfather         brain tumor     Suicide Attempts Maternal Uncle     Suicide Attempts Cousin        SOCIAL HISTORY  Social History     Tobacco Use    Smoking status: Light Smoker    Smokeless tobacco: Never   Vaping Use    Vaping status: Never Used   Substance and Sexual Activity    Alcohol use: Not Currently     Comment: DENIES    Drug use: Not on file     Comment: history of meth use - several years ago    Sexual activity: Never     Partners: Female     Comment:  6 years        CURRENT MEDICATIONS  Home Medications    **Home medications have not yet been reviewed for this encounter**       Audit from Redirected Encounters    **Home medications have not yet been reviewed for this encounter**         ALLERGIES  Allergies   Allergen Reactions    Benadryl [Diphenhydramine] Anxiety    Codeine Hives       PHYSICAL EXAM  VITAL SIGNS: /86   Pulse 78   Temp 36.2 °C (97.1 °F) (Temporal)   Resp 18   Ht 1.753 m (5' 9\")   Wt 83.9 kg (185 lb)   SpO2 94%   BMI 27.32 kg/m²    Pulse ox interpretation: I interpret this pulse ox as normal.  Constitutional: Alert in no apparent distress.  HENT: Normocephalic, atraumatic. Bilateral external ears normal, Nose normal. Moist mucous membranes.    Eyes: Pupils are equal and reactive, Conjunctiva normal.  EOMI, no nystagmus  Neck: Normal range of motion, Supple.  No meningeal irritation.  Lymphatic: No lymphadenopathy noted.   Cardiovascular: Regular rate and rhythm, no murmurs. Distal pulses intact.    Thorax & Lungs: Normal breath sounds.  No wheezing/rales/ronchi. No increased work of breathing  Abdomen: Soft, non-distended, non-tender to palpation. No palpable or pulsatile masses.   Skin: Warm, Dry, No erythema, No rash.   Musculoskeletal: Good range of motion in all major joints  Neurologic: Alert and orient x 4.  Speech is clear and cohesive.  Cranial nerves II through XII intact bilaterally.  5 out of 5 strength in 4 extremities with proximal and distal muscle " groups.  No pronator drift, straight leg raise intact bilaterally.  Finger-nose intact bilaterally.  Sensation intact to light light touch bilateral face and extremities.  Psychiatric: Affect normal, Judgment normal, Mood normal.       EKG/LABS  Results for orders placed or performed during the hospital encounter of 03/06/25   POCT glucose device results    Collection Time: 03/06/25  7:47 AM   Result Value Ref Range    POC Glucose, Blood 295 (H) 65 - 99 mg/dL   CBC WITH DIFFERENTIAL    Collection Time: 03/06/25  9:15 AM   Result Value Ref Range    WBC 7.6 4.8 - 10.8 K/uL    RBC 4.73 4.70 - 6.10 M/uL    Hemoglobin 14.2 14.0 - 18.0 g/dL    Hematocrit 42.4 42.0 - 52.0 %    MCV 89.6 81.4 - 97.8 fL    MCH 30.0 27.0 - 33.0 pg    MCHC 33.5 32.3 - 36.5 g/dL    RDW 43.8 35.9 - 50.0 fL    Platelet Count 244 164 - 446 K/uL    MPV 10.9 9.0 - 12.9 fL    Neutrophils-Polys 61.40 44.00 - 72.00 %    Lymphocytes 22.90 22.00 - 41.00 %    Monocytes 9.90 0.00 - 13.40 %    Eosinophils 3.80 0.00 - 6.90 %    Basophils 1.60 0.00 - 1.80 %    Immature Granulocytes 0.40 0.00 - 0.90 %    Nucleated RBC 0.00 0.00 - 0.20 /100 WBC    Neutrophils (Absolute) 4.63 1.82 - 7.42 K/uL    Lymphs (Absolute) 1.73 1.00 - 4.80 K/uL    Monos (Absolute) 0.75 0.00 - 0.85 K/uL    Eos (Absolute) 0.29 0.00 - 0.51 K/uL    Baso (Absolute) 0.12 0.00 - 0.12 K/uL    Immature Granulocytes (abs) 0.03 0.00 - 0.11 K/uL    NRBC (Absolute) 0.00 K/uL   COMP METABOLIC PANEL    Collection Time: 03/06/25  9:15 AM   Result Value Ref Range    Sodium 136 135 - 145 mmol/L    Potassium 4.2 3.6 - 5.5 mmol/L    Chloride 101 96 - 112 mmol/L    Co2 23 20 - 33 mmol/L    Anion Gap 12.0 7.0 - 16.0    Glucose 239 (H) 65 - 99 mg/dL    Bun 15 8 - 22 mg/dL    Creatinine 0.71 0.50 - 1.40 mg/dL    Calcium 9.4 8.5 - 10.5 mg/dL    Correct Calcium 9.4 8.5 - 10.5 mg/dL    AST(SGOT) 30 12 - 45 U/L    ALT(SGPT) 22 2 - 50 U/L    Alkaline Phosphatase 98 30 - 99 U/L    Total Bilirubin 0.3 0.1 - 1.5 mg/dL     Albumin 4.0 3.2 - 4.9 g/dL    Total Protein 7.1 6.0 - 8.2 g/dL    Globulin 3.1 1.9 - 3.5 g/dL    A-G Ratio 1.3 g/dL   APTT    Collection Time: 25  9:15 AM   Result Value Ref Range    APTT 28.0 24.7 - 36.0 sec   PROTHROMBIN TIME    Collection Time: 25  9:15 AM   Result Value Ref Range    PT 11.7 (L) 12.0 - 14.6 sec    INR 0.86 (L) 0.87 - 1.13   ESTIMATED GFR    Collection Time: 25  9:15 AM   Result Value Ref Range    GFR (CKD-EPI) 104 >60 mL/min/1.73 m 2   URINALYSIS CULTURE, IF INDICATED    Collection Time: 25 10:14 AM    Specimen: Urine, Clean Catch   Result Value Ref Range    Color Yellow     Character Clear     Specific Gravity 1.038 <1.035    Ph 5.5 5.0 - 8.0    Glucose >=1000 (A) Negative mg/dL    Ketones Negative Negative mg/dL    Protein Negative Negative mg/dL    Bilirubin Negative Negative    Urobilinogen, Urine 0.2 <=1.0 EU/dL    Nitrite Negative Negative    Leukocyte Esterase Negative Negative    Occult Blood Negative Negative    Micro Urine Req see below    POC CoV-2, FLU A/B, RSV by PCR    Collection Time: 25 10:16 AM   Result Value Ref Range    POC Influenza A RNA, PCR Negative Negative    POC Influenza B RNA, PCR Negative Negative    POC RSV, by PCR Negative Negative    POC SARS-CoV-2, PCR NotDetected NotDetected   EKG    Collection Time: 25  1:03 PM   Result Value Ref Range    Report       [facility] Emergency Dept.    Test Date:  2025  Pt Name:    ROGER FONTAINE                   Department: ER  MRN:        [id number]                      Room:        19  Gender:     Male                         Technician: [id number]  :        1965                   Requested By:ER TRIAGE PROTOCOL  Order #:    [id number]                    Joan MD: ANTON ANGLIN, DO    Measurements  Intervals                                Axis  Rate:       83                           P:          58  VT:         167                          QRS:        -25  QRSD:        98                           T:          65  QT:         355  QTc:        418    Interpretive Statements  Sinus rhythm  Borderline left axis deviation  Compared to ECG 01/11/2025 20:29:01  Sinus tachycardia no longer present  Electronically Signed On 03- 13:03:36 PST by ANTON ANGLIN, DO       I have independently interpreted this EKG    RADIOLOGY/PROCEDURES     Radiologist interpretation:  CT-HEAD W/O   Final Result      1.  Mild atrophy and white matter changes.   2.  No acute intracranial hemorrhage or territorial infarct.                      COURSE & MEDICAL DECISION MAKING    ASSESSMENT, COURSE AND PLAN  Care Narrative:   Seen and evaluated at bedside.  Patient describes some blurred/double vision, slurred speech, ataxia although nonreproducible on exam at this time.  Also has some malaise and fatigue.  History of diabetes but compliant with medications.  Hemodynamically stable, blood pressure well-controlled.  Add labs, include urine and viral studies as well as a CT head.    No leukocytosis, left shift or bandemia.  No anemia.  Mild hyperglycemia without DKA or other electrolyte derangement.  No coagulopathy.  Urinalysis with glucose, no ketones or infection.  RSV, COVID, influenza negative.  CT head is within normal limits.  EKG within normal limits, no ischemia.  Continuous telemetry thus far without ectopy or arrhythmia.    1240 - Patient reevaluated at bedside.  Overall no improvement but denies focal symptoms again at this time.  Aware of fairly unremarkable workup.  However also aware that stuttering symptoms could be sign of TIA or even unidentified stroke.  Cannot exclude arrhythmia either.  Strongly encourage admission for stroke workup.  However patient adamantly declines admission.  Declines MRI at this time from the ED.  Wants to return to work, job in which she just started despite offer for a work note.  He will follow-up with primary care and return for worsening  symptoms.      ADDITIONAL PROBLEMS MANAGED  Hypertension  Diabetes    DISPOSITION AND DISCUSSIONS  Patient is leaving AGAINST MEDICAL ADVICE.  He has been given all test results, and an extensive discussion with explanation as to why admission for stroke workup, restratification would be beneficial.  He is aware that if leaving without this evaluation may result in recurrent or persistent symptoms, return disability or even death.  He has 4 linear thinking and plans to return to work.  He has established outpatient care in which she will follow-up.    Discussion of management with other [facility] or appropriate source(s): None     Escalation of care considered, and ultimately not performed:after discussion with the patient / family, they have elected to decline an escalation in care    Patient is leaving AGAINST MEDICAL ADVICE, anticipatory guidance provided, continue home medications as previously indicated, close follow-up is encouraged and strict return instructions have been discussed. Patient is agreeable to the disposition and plan.      FINAL DIAGNOSIS  1. Blurred vision    2. Slurred speech    3. Ataxia    4. Hyperglycemia         Electronically signed by: Osiris Nelson D.O., 3/6/2025 12:56 PM

## 2025-03-06 NOTE — ED NOTES
Pt to blue 19 with triage RN. Pt changed into gown and connected to monitor. PIV placed, blood collected and sent to lab.

## 2025-03-06 NOTE — ED TRIAGE NOTES
"Chief Complaint   Patient presents with    Blurred Vision     Pt reports dizziness / blurry vision to both eyes x 8 days, + R sided HA. Pt was unable to get into PCP therefore came to ED.   Pt also reports intermittent slurred speech x2 days. Pt states he \"feels drunk\" when he tries to talk.   H/o DM, HTN, HLD.     Pt ambulatory with steady gait to  triage      Pt is GCS 15, speaking in full sentences, follows commands and responds appropriately to questions. Resp are even and unlabored.     Pt educated on triage process, updated/ agreeable to plan of care.    BP (!) 148/90   Pulse 88   Temp 36.2 °C (97.1 °F) (Temporal)   Resp 16   Ht 1.753 m (5' 9\")   Wt 83.9 kg (185 lb)   SpO2 97%   BMI 27.32 kg/m²     "

## 2025-03-06 NOTE — DISCHARGE INSTRUCTIONS
You are leaving the hospital AGAINST MEDICAL ADVICE.  Hospitalization for further evaluation of stroke or cardiac arrhythmia has been strongly recommended.  Leaving without further workup could result in recurrent, worsening symptoms, permanent disability or even death.    Strongly encouraged to follow-up with primary care this week for reevaluation, outpatient workup and medication management.    You may return to the emergency department at any time for reevaluation, and are encouraged to do so for persistent or recurrent headache, visual changes, slurred speech, focal weakness, difficulty ambulating, chest pain, syncope or other new concerns.

## 2025-03-06 NOTE — ED NOTES
Patient spoke w/ erp requesting to leave AMA d/t concern for missing work at new job.. Patient informed of risks up to and including death associated with leaving, educated by ERP.  Pt instructed to return to Emergency Department or call 911 if their condition worsens. Patient is A&Ox4 with a  GCS of 15. Patient verbalized understanding and signed AMA form.   Pt was also given follow up instructions.

## 2025-03-06 NOTE — ED NOTES
Charge RN notified of pt d/t concern for speech difficulties, symptoms starting 2 days ago per pt.   No unilateral deficits noted.

## 2025-03-07 ENCOUNTER — APPOINTMENT (OUTPATIENT)
Dept: RADIOLOGY | Facility: MEDICAL CENTER | Age: 60
End: 2025-03-07
Attending: INTERNAL MEDICINE
Payer: MEDICAID

## 2025-03-07 ENCOUNTER — HOSPITAL ENCOUNTER (OUTPATIENT)
Facility: MEDICAL CENTER | Age: 60
End: 2025-03-07
Attending: EMERGENCY MEDICINE | Admitting: INTERNAL MEDICINE
Payer: MEDICAID

## 2025-03-07 VITALS
WEIGHT: 182.76 LBS | TEMPERATURE: 98.1 F | DIASTOLIC BLOOD PRESSURE: 74 MMHG | RESPIRATION RATE: 16 BRPM | HEART RATE: 80 BPM | OXYGEN SATURATION: 97 % | HEIGHT: 69 IN | SYSTOLIC BLOOD PRESSURE: 103 MMHG | BODY MASS INDEX: 27.07 KG/M2

## 2025-03-07 DIAGNOSIS — R42 DYSEQUILIBRIUM: ICD-10-CM

## 2025-03-07 DIAGNOSIS — E78.5 HYPERLIPIDEMIA, UNSPECIFIED HYPERLIPIDEMIA TYPE: ICD-10-CM

## 2025-03-07 DIAGNOSIS — H53.8 BLURRY VISION: ICD-10-CM

## 2025-03-07 DIAGNOSIS — R73.9 HYPERGLYCEMIA: ICD-10-CM

## 2025-03-07 DIAGNOSIS — R42 DIZZINESS: ICD-10-CM

## 2025-03-07 DIAGNOSIS — Z72.0 TOBACCO ABUSE: ICD-10-CM

## 2025-03-07 DIAGNOSIS — I10 HYPERTENSION, UNSPECIFIED TYPE: ICD-10-CM

## 2025-03-07 PROBLEM — Z71.89 ACP (ADVANCE CARE PLANNING): Status: ACTIVE | Noted: 2025-03-07

## 2025-03-07 LAB
ANION GAP SERPL CALC-SCNC: 14 MMOL/L (ref 7–16)
BUN SERPL-MCNC: 16 MG/DL (ref 8–22)
CALCIUM SERPL-MCNC: 10.1 MG/DL (ref 8.5–10.5)
CHLORIDE SERPL-SCNC: 99 MMOL/L (ref 96–112)
CHOLEST SERPL-MCNC: 198 MG/DL (ref 100–199)
CO2 SERPL-SCNC: 20 MMOL/L (ref 20–33)
CREAT SERPL-MCNC: 0.71 MG/DL (ref 0.5–1.4)
ERYTHROCYTE [DISTWIDTH] IN BLOOD BY AUTOMATED COUNT: 41.9 FL (ref 35.9–50)
EST. AVERAGE GLUCOSE BLD GHB EST-MCNC: 289 MG/DL
GFR SERPLBLD CREATININE-BSD FMLA CKD-EPI: 104 ML/MIN/1.73 M 2
GLUCOSE BLD STRIP.AUTO-MCNC: 205 MG/DL (ref 65–99)
GLUCOSE SERPL-MCNC: 246 MG/DL (ref 65–99)
HBA1C MFR BLD: 11.7 % (ref 4–5.6)
HCT VFR BLD AUTO: 46.9 % (ref 42–52)
HDLC SERPL-MCNC: 47 MG/DL
HGB BLD-MCNC: 16 G/DL (ref 14–18)
LDLC SERPL CALC-MCNC: 87 MG/DL
MCH RBC QN AUTO: 29.8 PG (ref 27–33)
MCHC RBC AUTO-ENTMCNC: 34.1 G/DL (ref 32.3–36.5)
MCV RBC AUTO: 87.3 FL (ref 81.4–97.8)
PLATELET # BLD AUTO: 246 K/UL (ref 164–446)
PMV BLD AUTO: 10.6 FL (ref 9–12.9)
POTASSIUM SERPL-SCNC: 4.6 MMOL/L (ref 3.6–5.5)
RBC # BLD AUTO: 5.37 M/UL (ref 4.7–6.1)
SODIUM SERPL-SCNC: 133 MMOL/L (ref 135–145)
TRIGL SERPL-MCNC: 318 MG/DL (ref 0–149)
WBC # BLD AUTO: 6.1 K/UL (ref 4.8–10.8)

## 2025-03-07 PROCEDURE — 80048 BASIC METABOLIC PNL TOTAL CA: CPT

## 2025-03-07 PROCEDURE — 99285 EMERGENCY DEPT VISIT HI MDM: CPT

## 2025-03-07 PROCEDURE — G0378 HOSPITAL OBSERVATION PER HR: HCPCS

## 2025-03-07 PROCEDURE — 83036 HEMOGLOBIN GLYCOSYLATED A1C: CPT

## 2025-03-07 PROCEDURE — 82962 GLUCOSE BLOOD TEST: CPT

## 2025-03-07 PROCEDURE — 70551 MRI BRAIN STEM W/O DYE: CPT

## 2025-03-07 PROCEDURE — 700102 HCHG RX REV CODE 250 W/ 637 OVERRIDE(OP): Mod: UD | Performed by: INTERNAL MEDICINE

## 2025-03-07 PROCEDURE — 36415 COLL VENOUS BLD VENIPUNCTURE: CPT

## 2025-03-07 PROCEDURE — 85027 COMPLETE CBC AUTOMATED: CPT

## 2025-03-07 PROCEDURE — 99222 1ST HOSP IP/OBS MODERATE 55: CPT | Mod: 25 | Performed by: INTERNAL MEDICINE

## 2025-03-07 PROCEDURE — 99497 ADVNCD CARE PLAN 30 MIN: CPT | Performed by: INTERNAL MEDICINE

## 2025-03-07 PROCEDURE — A9270 NON-COVERED ITEM OR SERVICE: HCPCS | Mod: UD | Performed by: INTERNAL MEDICINE

## 2025-03-07 PROCEDURE — 80061 LIPID PANEL: CPT

## 2025-03-07 RX ORDER — ATORVASTATIN CALCIUM 40 MG/1
40 TABLET, FILM COATED ORAL NIGHTLY
Status: DISCONTINUED | OUTPATIENT
Start: 2025-03-07 | End: 2025-03-07 | Stop reason: HOSPADM

## 2025-03-07 RX ORDER — OXCARBAZEPINE 300 MG/1
1200 TABLET, FILM COATED ORAL EVERY MORNING
Status: DISCONTINUED | OUTPATIENT
Start: 2025-03-08 | End: 2025-03-07 | Stop reason: HOSPADM

## 2025-03-07 RX ORDER — PRAMIPEXOLE DIHYDROCHLORIDE 0.5 MG/1
1 TABLET ORAL
Status: DISCONTINUED | OUTPATIENT
Start: 2025-03-07 | End: 2025-03-07 | Stop reason: HOSPADM

## 2025-03-07 RX ORDER — LISINOPRIL 20 MG/1
40 TABLET ORAL DAILY
Status: DISCONTINUED | OUTPATIENT
Start: 2025-03-08 | End: 2025-03-07 | Stop reason: HOSPADM

## 2025-03-07 RX ORDER — ASPIRIN 81 MG/1
81 TABLET ORAL DAILY
Status: DISCONTINUED | OUTPATIENT
Start: 2025-03-08 | End: 2025-03-07 | Stop reason: HOSPADM

## 2025-03-07 RX ORDER — PROMETHAZINE HYDROCHLORIDE 25 MG/1
12.5-25 TABLET ORAL EVERY 4 HOURS PRN
Status: DISCONTINUED | OUTPATIENT
Start: 2025-03-07 | End: 2025-03-07 | Stop reason: HOSPADM

## 2025-03-07 RX ORDER — ONDANSETRON 2 MG/ML
4 INJECTION INTRAMUSCULAR; INTRAVENOUS EVERY 4 HOURS PRN
Status: DISCONTINUED | OUTPATIENT
Start: 2025-03-07 | End: 2025-03-07 | Stop reason: HOSPADM

## 2025-03-07 RX ORDER — ONDANSETRON 4 MG/1
4 TABLET, ORALLY DISINTEGRATING ORAL EVERY 4 HOURS PRN
Status: DISCONTINUED | OUTPATIENT
Start: 2025-03-07 | End: 2025-03-07 | Stop reason: HOSPADM

## 2025-03-07 RX ORDER — LORAZEPAM 2 MG/ML
0.5 INJECTION INTRAMUSCULAR ONCE
Status: DISCONTINUED | OUTPATIENT
Start: 2025-03-07 | End: 2025-03-07 | Stop reason: HOSPADM

## 2025-03-07 RX ORDER — QUETIAPINE FUMARATE 50 MG/1
50 TABLET, FILM COATED ORAL
Status: DISCONTINUED | OUTPATIENT
Start: 2025-03-07 | End: 2025-03-07 | Stop reason: HOSPADM

## 2025-03-07 RX ORDER — DEXTROSE MONOHYDRATE 25 G/50ML
25 INJECTION, SOLUTION INTRAVENOUS
Status: DISCONTINUED | OUTPATIENT
Start: 2025-03-07 | End: 2025-03-07 | Stop reason: HOSPADM

## 2025-03-07 RX ORDER — ALBUTEROL SULFATE 90 UG/1
1 INHALANT RESPIRATORY (INHALATION)
Status: DISCONTINUED | OUTPATIENT
Start: 2025-03-07 | End: 2025-03-07 | Stop reason: HOSPADM

## 2025-03-07 RX ORDER — PROCHLORPERAZINE EDISYLATE 5 MG/ML
5-10 INJECTION INTRAMUSCULAR; INTRAVENOUS EVERY 4 HOURS PRN
Status: DISCONTINUED | OUTPATIENT
Start: 2025-03-07 | End: 2025-03-07 | Stop reason: HOSPADM

## 2025-03-07 RX ORDER — HYDROCHLOROTHIAZIDE 12.5 MG/1
12.5 TABLET ORAL DAILY
Status: DISCONTINUED | OUTPATIENT
Start: 2025-03-08 | End: 2025-03-07 | Stop reason: HOSPADM

## 2025-03-07 RX ORDER — LORAZEPAM 1 MG/1
0.5 TABLET ORAL EVERY 4 HOURS PRN
Status: DISCONTINUED | OUTPATIENT
Start: 2025-03-07 | End: 2025-03-07 | Stop reason: HOSPADM

## 2025-03-07 RX ORDER — PROMETHAZINE HYDROCHLORIDE 25 MG/1
12.5-25 SUPPOSITORY RECTAL EVERY 4 HOURS PRN
Status: DISCONTINUED | OUTPATIENT
Start: 2025-03-07 | End: 2025-03-07 | Stop reason: HOSPADM

## 2025-03-07 RX ORDER — INSULIN LISPRO 100 [IU]/ML
1-6 INJECTION, SOLUTION INTRAVENOUS; SUBCUTANEOUS
Status: DISCONTINUED | OUTPATIENT
Start: 2025-03-07 | End: 2025-03-07 | Stop reason: HOSPADM

## 2025-03-07 RX ORDER — ACETAMINOPHEN 325 MG/1
650 TABLET ORAL EVERY 6 HOURS PRN
Status: DISCONTINUED | OUTPATIENT
Start: 2025-03-07 | End: 2025-03-07 | Stop reason: HOSPADM

## 2025-03-07 RX ORDER — ENOXAPARIN SODIUM 100 MG/ML
40 INJECTION SUBCUTANEOUS DAILY
Status: DISCONTINUED | OUTPATIENT
Start: 2025-03-07 | End: 2025-03-07 | Stop reason: HOSPADM

## 2025-03-07 RX ORDER — ESCITALOPRAM OXALATE 10 MG/1
20 TABLET ORAL DAILY
Status: DISCONTINUED | OUTPATIENT
Start: 2025-03-08 | End: 2025-03-07 | Stop reason: HOSPADM

## 2025-03-07 RX ADMIN — LORAZEPAM 0.5 MG: 1 TABLET ORAL at 14:17

## 2025-03-07 ASSESSMENT — COGNITIVE AND FUNCTIONAL STATUS - GENERAL
CLIMB 3 TO 5 STEPS WITH RAILING: A LITTLE
SUGGESTED CMS G CODE MODIFIER DAILY ACTIVITY: CH
WALKING IN HOSPITAL ROOM: A LITTLE
DAILY ACTIVITIY SCORE: 24
SUGGESTED CMS G CODE MODIFIER MOBILITY: CJ
MOBILITY SCORE: 22

## 2025-03-07 ASSESSMENT — ENCOUNTER SYMPTOMS
ABDOMINAL PAIN: 0
PALPITATIONS: 0
FOCAL WEAKNESS: 1
VOMITING: 0
SENSORY CHANGE: 1
CHILLS: 0
NAUSEA: 0
FEVER: 0
SHORTNESS OF BREATH: 0
COUGH: 0

## 2025-03-07 ASSESSMENT — LIFESTYLE VARIABLES
TOTAL SCORE: 0
ON A TYPICAL DAY WHEN YOU DRINK ALCOHOL HOW MANY DRINKS DO YOU HAVE: 0
HAVE PEOPLE ANNOYED YOU BY CRITICIZING YOUR DRINKING: NO
HOW MANY TIMES IN THE PAST YEAR HAVE YOU HAD 5 OR MORE DRINKS IN A DAY: 0
EVER FELT BAD OR GUILTY ABOUT YOUR DRINKING: NO
CONSUMPTION TOTAL: NEGATIVE
AVERAGE NUMBER OF DAYS PER WEEK YOU HAVE A DRINK CONTAINING ALCOHOL: 0
TOTAL SCORE: 0
TOTAL SCORE: 0
EVER HAD A DRINK FIRST THING IN THE MORNING TO STEADY YOUR NERVES TO GET RID OF A HANGOVER: NO
HAVE YOU EVER FELT YOU SHOULD CUT DOWN ON YOUR DRINKING: NO
ALCOHOL_USE: NO

## 2025-03-07 ASSESSMENT — PATIENT HEALTH QUESTIONNAIRE - PHQ9
2. FEELING DOWN, DEPRESSED, IRRITABLE, OR HOPELESS: NOT AT ALL
1. LITTLE INTEREST OR PLEASURE IN DOING THINGS: NOT AT ALL
SUM OF ALL RESPONSES TO PHQ9 QUESTIONS 1 AND 2: 0

## 2025-03-07 ASSESSMENT — PAIN DESCRIPTION - PAIN TYPE: TYPE: ACUTE PAIN

## 2025-03-07 ASSESSMENT — FIBROSIS 4 INDEX: FIB4 SCORE: 1.6

## 2025-03-07 NOTE — ED TRIAGE NOTES
Chief Complaint   Patient presents with    Blurred Vision     Both eyes blurred vision, more  on the left eyes since 11 days ago  + balance problem  +light headedness    Pt was seen yesterday with the same complaints was advised to stay but signed AMA because of his daughter     Pain:  0/10  Ambulatory:  Yes  Alert and Oriented: x 4  Oxygen Treatment: No    Pt came in to triage for the above complaints.     Pt is speaking in full sentences, follows commands and responds appropriately to questions.     Respirations are even and unlabored.    Pt placed in lobby. Pt educated on triage process.     Pt encouraged to inform staff for any changes in condition or if needs help while waiting to be room in.      Vitals:    03/07/25 0638   BP: (!) 143/92   Pulse: 95   Resp: 16   Temp: 35.9 °C (96.6 °F)   SpO2: 98%

## 2025-03-07 NOTE — ASSESSMENT & PLAN NOTE
C/f central etiologies with transient numbness of the LUE  MRI brain w/o contrast  Check lipids and A1c  Monitor on tele

## 2025-03-07 NOTE — DISCHARGE SUMMARY
Discharge Summary    CHIEF COMPLAINT ON ADMISSION  Chief Complaint   Patient presents with    Blurred Vision     Both eyes blurred vision, more  on the left eyes since 11 days ago  + balance problem  +light headedness    Pt was seen yesterday with the same complaints was advised to stay but signed AMA because of his daughter       Reason for Admission  Blurred vision     Admission Date  3/7/2025    CODE STATUS  Full Code    HPI & HOSPITAL COURSE  This is a 59 y.o. male here with multiple medical issues who presented to hospital with various neurological symptoms including blurred vision, balance issues and left upper extremity weakness and numbness.  He was admitted to the hospital for TIA rule out.  His symptoms were not fully classic of TIA.  His MRI brain was negative.  His symptoms resolved.  I think that this could be stress related.  He is already optimized on multiple cardiovascular medications and is deemed stable for discharge home.    Therefore, he is discharged in good and stable condition to home with close outpatient follow-up.    Discharge Date  3/7/2025    FOLLOW UP ITEMS POST DISCHARGE  Follow-up with his primary care physician 1 to 2 weeks.    DISCHARGE DIAGNOSES  Principal Problem:    Dizziness (POA: Yes)  Resolved Problems:    * No resolved hospital problems. *      FOLLOW UP  No future appointments.  Claudia Nguyen M.D.  [id number] [facility] [id number] [phone]    Follow up in 1 week(s)        MEDICATIONS ON DISCHARGE     Medication List        CONTINUE taking these medications        Instructions   albuterol 108 (90 Base) MCG/ACT Aers inhalation aerosol   Inhale 1 Puff by mouth every 6 hours as needed.  Dose: 1 Puff     aspirin EC 81 MG Tbec  Commonly known as: Ecotrin   Take 81 mg by mouth every day.  Dose: 81 mg     atorvastatin 40 MG Tabs  Commonly known as: Lipitor   Take 40 mg by mouth every evening.  Dose: 40 mg     B-12 PO   Take 1 Tab by mouth every day.  Dose: 1  Tablet     BIOTIN PO   Take 1 Tab by mouth every day.  Dose: 1 Tablet     escitalopram 20 MG tablet  Commonly known as: Lexapro   Take 20 mg by mouth every day.  Dose: 20 mg     FLAXSEED OIL PO   Take 1 Cap by mouth every day.  Dose: 1 Capsule     hydroCHLOROthiazide 12.5 MG tablet   Take 12.5 mg by mouth every day.  Dose: 12.5 mg     lisinopril 40 MG tablet  Commonly known as: Prinivil   Take 40 mg by mouth every day.  Dose: 40 mg     metformin 1000 MG tablet  Commonly known as: Glucophage   Take 1,000 mg by mouth 2 times a day, with meals.  Dose: 1,000 mg     Mirapex 1 MG Tabs  Generic drug: pramipexole   Take 1 mg by mouth every bedtime.  Dose: 1 mg     MULTI FOR HIM 50+ PO   Take 1 Tab by mouth every day.  Dose: 1 Tablet     OMEGA 3 PO   Take 1 Tab by mouth every day.  Dose: 1 Tablet     OXcarbazepine 300 MG Tabs  Commonly known as: Trileptal   Take 1,200 mg by mouth every morning. 1200 mg= 4 tablets  Dose: 1,200 mg     Potassium 99 MG Tabs   Take 99 mg by mouth every day.  Dose: 99 mg     SEROquel 50 MG tablet  Generic drug: QUEtiapine   Take 50 mg by mouth at bedtime.  Dose: 50 mg              Allergies  Allergies   Allergen Reactions    Codeine Hives    Diphenhydramine Anxiety       DIET  Orders Placed This Encounter   Procedures    Diet Order Diet: Consistent CHO (Diabetic)     Standing Status:   Standing     Number of Occurrences:   1     Diet::   Consistent CHO (Diabetic) [4]       ACTIVITY  As tolerated.  Weight bearing as tolerated    CONSULTATIONS  none    PROCEDURES  MR-BRAIN-W/O   Final Result      1.  No acute abnormality.   2.  Mild cerebral volume loss.   3.  Mild chronic microvascular ischemic disease.            LABORATORY  Lab Results   Component Value Date    SODIUM 133 (L) 03/07/2025    POTASSIUM 4.6 03/07/2025    CHLORIDE 99 03/07/2025    CO2 20 03/07/2025    GLUCOSE 246 (H) 03/07/2025    BUN 16 03/07/2025    CREATININE 0.71 03/07/2025    CREATININE 0.81 06/02/2011        Lab Results    Component Value Date    WBC 6.1 03/07/2025    WBC 9.7 06/02/2011    HEMOGLOBIN 16.0 03/07/2025    HEMATOCRIT 46.9 03/07/2025    PLATELETCT 246 03/07/2025        Total time of the discharge process exceeds 43 minutes.

## 2025-03-07 NOTE — ED NOTES
Patient assisted to the restroom. MRI screening completed and MD contacted for medication prior to MRI.

## 2025-03-07 NOTE — ED NOTES
Pharmacy Medication Reconciliation      ~Medication reconciliation updated and complete per patient at bedside   ~Allergies have been verified and updated   ~No oral ABX within the last 30 days  ~Is dispense history available: no    ~Patient home pharmacy :  [facility]      ~Anticoagulants (rivaroxaban, apixaban, edoxaban, dabigatran, warfarin, enoxaparin) taken in the last 14 days? No

## 2025-03-07 NOTE — ED NOTES
MRI called prior to medicating patient. Held medication administration until call back for availability to take patient.

## 2025-03-07 NOTE — ED PROVIDER NOTES
ER Provider Note    Scribed for Perry Weiss M.D. by Cliff Chapa. 3/7/2025   7:12 AM    Primary Care Provider: Claudia Nguyen M.D.    CHIEF COMPLAINT  Chief Complaint   Patient presents with    Blurred Vision     Both eyes blurred vision, more  on the left eyes since 11 days ago  + balance problem  +light headedness    Pt was seen yesterday with the same complaints was advised to stay but signed AMA because of his daughter     EXTERNAL RECORDS REVIEWED  Inpatient Notes Patient was seen here yesterday and hospitalization recommended for further evaluation. Patient declined and discharged AMA.     HPI/ROS  LIMITATION TO HISTORY   Select: : None  OUTSIDE HISTORIAN(S):  None    El Andrade June III is a 59 y.o. male who presents to the ED for evaluation of dizziness related to balance onset 11 days ago. He explains having experienced a dull headache last Monday with lightheadedness that persisted til the weekend. He also had associated dry mouth and blurred vision. He originally blamed his symptoms on his diabetes; however, he notes his blood sugar was in the 200s which he notes is normal for him. He then experienced an off balanced swaying sensation. He then came to the ED yesterday for evaluation but left AMA due to work and personal reasons. He returns today for repeated bilateral diplopia, dizziness related to balance and dry cough. 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     PAST MEDICAL HISTORY  Past Medical History:   Diagnosis Date    Anxiety     ASTHMA     seasonal asthma    Bipolar disorder (HCC)     Depression     Hypertension     Hyponatremia     IV drug user     Panic disorder     Substance abuse (HCC)     Suicide attempt (HCC)        SURGICAL HISTORY  History reviewed. No pertinent surgical history.    FAMILY HISTORY  Family History   Problem Relation Age of Onset    Heart Disease Father         MI at 59    Diabetes Maternal Grandmother     Hypertension Maternal  "Grandmother     Cancer Paternal Grandfather         brain tumor     Suicide Attempts Maternal Uncle     Suicide Attempts Cousin        SOCIAL HISTORY   reports that he has been smoking. He has never used smokeless tobacco. He reports that he does not currently use alcohol.  Drugs: Methamphetamines and Intravenous.    CURRENT MEDICATIONS  Previous Medications    ALBUTEROL (VENTOLIN OR PROVENTIL) 108 (90 BASE) MCG/ACT AERS    Inhale 1 Puff by mouth every 6 hours as needed.    ASPIRIN EC (ECOTRIN) 81 MG TABLET DELAYED RESPONSE    Take 81 mg by mouth every day.    ATORVASTATIN (LIPITOR) 40 MG TAB    Take 40 mg by mouth every evening.    BIOTIN PO    Take 1 Tab by mouth every day.    CYANOCOBALAMIN (B-12 PO)    Take 1 Tab by mouth every day.    ESCITALOPRAM (LEXAPRO) 10 MG TAB    Take 20 mg by mouth every day.    FLAXSEED, LINSEED, (FLAXSEED OIL PO)    Take 1 Cap by mouth every day.    HYDROCHLOROTHIAZIDE (HYDRODIURIL) 12.5 MG TABLET    Take 12.5 mg by mouth every day.    LISINOPRIL (PRINIVIL) 40 MG TABLET    Take 40 mg by mouth every day.    METFORMIN (GLUCOPHAGE) 1000 MG TABLET    Take 1,000 mg by mouth 2 times a day, with meals.    MULTIPLE VITAMINS-MINERALS (MULTI FOR HIM 50+ PO)    Take 1 Tab by mouth every day.    OMEGA-3 FATTY ACIDS (OMEGA 3 PO)    Take 1 Tab by mouth every day.    OXCARBAZEPINE (TRILEPTAL) 300 MG TAB    Take 1,200 mg by mouth every bedtime. Pt reports that he takes 4 tablets at night    POTASSIUM 99 MG TAB    Take 99 mg by mouth every day.    PRAMIPEXOLE (MIRAPEX) 1 MG TAB    Take 1 mg by mouth every bedtime.    QUETIAPINE (SEROQUEL) 50 MG TABLET    Take 50 mg by mouth as needed (For sleep).       ALLERGIES  Allergies   Allergen Reactions    Benadryl [Diphenhydramine] Anxiety    Codeine Hives        PHYSICAL EXAM  BP (!) 143/92   Pulse 95   Temp 35.9 °C (96.6 °F) (Temporal)   Resp 16   Ht 1.753 m (5' 9\")   Wt 82.9 kg (182 lb 12.2 oz)   SpO2 98%   BMI 26.99 kg/m²    Nursing note and vitals " reviewed.  Constitutional: Well-developed and well-nourished. No distress.   HENT: Head is normocephalic and atraumatic. Oropharynx is clear and moist without exudate or erythema.   Eyes: Pupils are equal, round, and reactive to light. Conjunctiva are normal.   Cardiovascular: Normal rate and regular rhythm. No murmur heard. Normal radial pulses.  Pulmonary/Chest: Breath sounds normal. No wheezes or rales.   Abdominal: Soft and non-tender. No distention    Musculoskeletal: Extremities exhibit normal range of motion without edema or tenderness.   Neurological: Alert & oriented x 3, Normal cognition, Cranial nerves II-XII are intact, normal speech and language, strength to bilateral upper and lower extremities is normal, sensation is intact throughout.  Skin: Skin is warm and dry. No rash.   Psychiatric: Normal mood and affect. Appropriate for clinical situation    DIAGNOSTIC STUDIES    Labs:   Results for orders placed or performed during the hospital encounter of 03/07/25   Lipid Profile (Lipid Panel) Fasting    Collection Time: 03/07/25  7:29 AM   Result Value Ref Range    Cholesterol,Tot 198 100 - 199 mg/dL    Triglycerides 318 (H) 0 - 149 mg/dL    HDL 47 >=40 mg/dL    LDL 87 <100 mg/dL   CBC WITHOUT DIFFERENTIAL    Collection Time: 03/07/25  7:39 AM   Result Value Ref Range    WBC 6.1 4.8 - 10.8 K/uL    RBC 5.37 4.70 - 6.10 M/uL    Hemoglobin 16.0 14.0 - 18.0 g/dL    Hematocrit 46.9 42.0 - 52.0 %    MCV 87.3 81.4 - 97.8 fL    MCH 29.8 27.0 - 33.0 pg    MCHC 34.1 32.3 - 36.5 g/dL    RDW 41.9 35.9 - 50.0 fL    Platelet Count 246 164 - 446 K/uL    MPV 10.6 9.0 - 12.9 fL   BASIC METABOLIC PANEL    Collection Time: 03/07/25  7:39 AM   Result Value Ref Range    Sodium 133 (L) 135 - 145 mmol/L    Potassium 4.6 3.6 - 5.5 mmol/L    Chloride 99 96 - 112 mmol/L    Co2 20 20 - 33 mmol/L    Glucose 246 (H) 65 - 99 mg/dL    Bun 16 8 - 22 mg/dL    Creatinine 0.71 0.50 - 1.40 mg/dL    Calcium 10.1 8.5 - 10.5 mg/dL    Anion Gap  14.0 7.0 - 16.0   ESTIMATED GFR    Collection Time: 03/07/25  7:39 AM   Result Value Ref Range    GFR (CKD-EPI) 104 >60 mL/min/1.73 m 2   HEMOGLOBIN A1C    Collection Time: 03/07/25  7:39 AM   Result Value Ref Range    Glycohemoglobin 11.7 (H) 4.0 - 5.6 %    Est Avg Glucose 289 mg/dL   POCT glucose device results    Collection Time: 03/07/25  8:34 AM   Result Value Ref Range    POC Glucose, Blood 205 (H) 65 - 99 mg/dL     ASSESSMENT AND PLAN    7:12 AM - Patient was evaluated at bedside. Patient returns today to continue his workup as recommended yesterday. I do feel the patient will likely benefit from MRI and further evaluation as outlined in Dr. Nelson's note. Ordered for Basic Metabolic Panel and CBC w/o diff to evaluate. Patient verbalizes understanding and support with my plan of care.  Differential diagnoses include but not limited to: CVA, Arrhythmia or Vestibulitis.    7:31 AM - Spoke with Dr. Jorge, (Hospitalist), about the patient's condition who agreed to evaluate the patient for hospitalization.      NIH stroke scale score of 0    The patient is not an alteplase candidate due to the duration of symptoms    DISPOSITION AND DISCUSSIONS    I have discussed management of the patient with the following physicians and GRISEL's:  Luisito Carrillo (Hospitalist),     Discussion of management with other [facility] or appropriate source(s): None     DISPOSITION:  Patient will be hospitalized by Dr. Jorge in guarded condition.     FINAL DIAGNOSIS  1. Dizziness    2. Dysequilibrium    3. Blurry vision    4. Hyperglycemia    5. Hypertension, unspecified type    6. Hyperlipidemia, unspecified hyperlipidemia type    7. Tobacco abuse       Cliff LYLES (Irina), am scribing for, and in the presence of, Perry Weiss M.D..    Electronically signed by: Cliff Mera), 3/7/2025    Perry LYLES M.D. personally performed the services described in this documentation, as scribed by Cliff Chapa in my presence, and  it is both accurate and complete.      The note accurately reflects work and decisions made by me.  Perry Weiss M.D.  3/7/2025  12:59 PM

## 2025-03-07 NOTE — CARE PLAN
The patient is Stable - Low risk of patient condition declining or worsening    Shift Goals  Clinical Goals: MRI head, safety, test results  Patient Goals: feel better  Family Goals: not present    Progress made toward(s) clinical / shift goals:    Problem: Knowledge Deficit - Standard  Goal: Patient and family/care givers will demonstrate understanding of plan of care, disease process/condition, diagnostic tests and medications  Description: Target End Date:  1-3 days or as soon as patient condition allows    Document in Patient Education    1.  Patient and family/caregiver oriented to unit, equipment, visitation policy and means for communicating concern  2.  Complete/review Learning Assessment  3.  Assess knowledge level of disease process/condition, treatment plan, diagnostic tests and medications  4.  Explain disease process/condition, treatment plan, diagnostic tests and medications  Outcome: Progressing       Patient is not progressing towards the following goals:

## 2025-03-07 NOTE — PROGRESS NOTES
Discharge orders received.  Patient arrived to the discharge lounge.  PIV removed.  Instructions given, medications reviewed and general discharge education provided to patient.  Follow up appointments discussed.  Patient verbalized understanding of dc instructions and prescriptions.  Patient signed discharge instructions.  Patient verbalized understanding had all belongings with him.  Patient left with family and no new meds.  Wished patient a speedy recovery.

## 2025-03-07 NOTE — H&P
Hospital Medicine History & Physical Note    Date of Service  3/7/2025    Primary Care Physician  Claudia Nguyen M.D.    Consultants  none    Specialist Names: none    Code Status  Full Code    Chief Complaint  Chief Complaint   Patient presents with    Blurred Vision     Both eyes blurred vision, more  on the left eyes since 11 days ago  + balance problem  +light headedness    Pt was seen yesterday with the same complaints was advised to stay but signed AMA because of his daughter       History of Presenting Illness  El Andrade Renee III is a 59 y.o. male who presented 3/7/2025 with multiple medical issues including smokeless tobacco and bipolar disorder with hypertension diabetes mellitus type 2 presents to the hospital with blurred vision, speech issues and balance issues.  Patient presented to the ER yesterday was recommended for admission but need to leave are to take care of social issues.  He comes back today with persistent symptoms.  He said all the symptoms started 2 Mondays ago with right-sided headache which he normally gets.  He says he has a feeling of like he is almost drunk or bus without drinking alcohol.  Denies any new medications either prescription or over-the-counter.  He says the residual symptom he has today is a weird feeling in his right upper extremity.  Denies any other new symptoms.  I personally spoke with Dr. Weiss ER physician group in detail with the patient's case    I discussed the plan of care with patient.    Review of Systems  Review of Systems   Constitutional:  Positive for malaise/fatigue. Negative for chills and fever.   Respiratory:  Negative for cough and shortness of breath.    Cardiovascular:  Negative for chest pain and palpitations.   Gastrointestinal:  Negative for abdominal pain, nausea and vomiting.   Neurological:  Positive for sensory change and focal weakness.       Past Medical History   has a past medical history of Anxiety, ASTHMA, Bipolar disorder  (HCC), Depression, Hypertension, Hyponatremia, IV drug user, Panic disorder, Substance abuse (HCC), and Suicide attempt (HCC).    Surgical History   has no past surgical history on file.     Family History  family history includes Cancer in his paternal grandfather; Diabetes in his maternal grandmother; Heart Disease in his father; Hypertension in his maternal grandmother; Suicide Attempts in his cousin and maternal uncle.   Family history reviewed with patient. There is no family history that is pertinent to the chief complaint.     Social History   reports that he has been smoking. He has never used smokeless tobacco. He reports that he does not currently use alcohol.  Drugs: Methamphetamines and Intravenous.    Allergies  Allergies   Allergen Reactions    Codeine Hives    Diphenhydramine Anxiety       Medications  Prior to Admission Medications   Prescriptions Last Dose Informant Patient Reported? Taking?   BIOTIN PO 3/7/2025 at  5:00 AM Patient Yes Yes   Sig: Take 1 Tab by mouth every day.   Cyanocobalamin (B-12 PO) 3/7/2025 at  5:00 AM Patient Yes Yes   Sig: Take 1 Tab by mouth every day.   Flaxseed, Linseed, (FLAXSEED OIL PO) 3/7/2025 at  5:00 AM Patient Yes Yes   Sig: Take 1 Cap by mouth every day.   Multiple Vitamins-Minerals (MULTI FOR HIM 50+ PO) 3/7/2025 at  5:00 AM Patient Yes Yes   Sig: Take 1 Tab by mouth every day.   OXcarbazepine (TRILEPTAL) 300 MG Tab 3/7/2025 at  5:00 AM Patient Yes Yes   Sig: Take 1,200 mg by mouth every morning. 1200 mg= 4 tablets   Omega-3 Fatty Acids (OMEGA 3 PO) 3/7/2025 at  5:00 AM Patient Yes Yes   Sig: Take 1 Tab by mouth every day.   Potassium 99 MG Tab 3/7/2025 at  5:00 AM Patient Yes Yes   Sig: Take 99 mg by mouth every day.   albuterol (VENTOLIN OR PROVENTIL) 108 (90 BASE) MCG/ACT AERS Unknown Patient Yes No   Sig: Inhale 1 Puff by mouth every 6 hours as needed.   aspirin EC (ECOTRIN) 81 MG Tablet Delayed Response 3/7/2025 at  5:00 AM Patient Yes Yes   Sig: Take 81 mg  "by mouth every day.   atorvastatin (LIPITOR) 40 MG Tab 3/6/2025 Evening Patient Yes Yes   Sig: Take 40 mg by mouth every evening.   escitalopram (LEXAPRO) 20 MG tablet 3/7/2025 at  5:00 AM Patient Yes Yes   Sig: Take 20 mg by mouth every day.   hydroCHLOROthiazide (HYDRODIURIL) 12.5 MG tablet 3/7/2025 at  5:00 AM Patient Yes Yes   Sig: Take 12.5 mg by mouth every day.   lisinopril (PRINIVIL) 40 MG tablet 3/7/2025 at  5:00 AM Patient Yes Yes   Sig: Take 40 mg by mouth every day.   metformin (GLUCOPHAGE) 1000 MG tablet 3/7/2025 at  5:00 AM Patient Yes Yes   Sig: Take 1,000 mg by mouth 2 times a day, with meals.   pramipexole (MIRAPEX) 1 MG Tab 3/6/2025 Bedtime Patient Yes Yes   Sig: Take 1 mg by mouth every bedtime.   quetiapine (SEROQUEL) 50 MG tablet 3/6/2025 Evening Patient Yes Yes   Sig: Take 50 mg by mouth at bedtime.      Facility-Administered Medications: None       Physical Exam  Temp:  [35.9 °C (96.6 °F)-36.7 °C (98.1 °F)] 36.7 °C (98.1 °F)  Pulse:  [80-95] 80  Resp:  [16-20] 16  BP: (103-143)/(71-92) 103/74  SpO2:  [92 %-98 %] 97 %  Blood Pressure: (!) 142/78   Temperature: 35.9 °C (96.6 °F)   Pulse: 88   Respiration: 20   Pulse Oximetry: 95 %       Physical Exam    Laboratory:  Recent Labs     03/06/25  0915 03/07/25  0739   WBC 7.6 6.1   RBC 4.73 5.37   HEMOGLOBIN 14.2 16.0   HEMATOCRIT 42.4 46.9   MCV 89.6 87.3   MCH 30.0 29.8   MCHC 33.5 34.1   RDW 43.8 41.9   PLATELETCT 244 246   MPV 10.9 10.6     Recent Labs     03/06/25  0915 03/07/25  0739   SODIUM 136 133*   POTASSIUM 4.2 4.6   CHLORIDE 101 99   CO2 23 20   GLUCOSE 239* 246*   BUN 15 16   CREATININE 0.71 0.71   CALCIUM 9.4 10.1     Recent Labs     03/06/25  0915 03/07/25  0739   ALTSGPT 22  --    ASTSGOT 30  --    ALKPHOSPHAT 98  --    TBILIRUBIN 0.3  --    GLUCOSE 239* 246*     Recent Labs     03/06/25  0915   APTT 28.0   INR 0.86*     No results for input(s): \"NTPROBNP\" in the last 72 hours.  Recent Labs     03/07/25  0729   TRIGLYCERIDE 318* " "  HDL 47   LDL 87     No results for input(s): \"TROPONINT\" in the last 72 hours.    Imaging:  MR-BRAIN-W/O   Final Result      1.  No acute abnormality.   2.  Mild cerebral volume loss.   3.  Mild chronic microvascular ischemic disease.          I personally reviewed the EKG which shows normal sinus rhythm heart rate 83 no evidence of acute ST segment changes.    I personally viewed the CT head that showed no acute findings and no midline shift.    I personally reviewed the CBC, UA, CMP, viral PCR panel.    Assessment/Plan:  Justification for Admission Status  I anticipate this patient is appropriate for observation status at this time because TIA r/o    Patient will need a Telemetry bed on MEDICAL service .  The need is secondary to  a ricardo.    * Dizziness- (present on admission)  Assessment & Plan  C/f central etiologies with transient numbness of the LUE  MRI brain w/o contrast  Check lipids and A1c  Monitor on tele    ACP (advance care planning)- (present on admission)  Assessment & Plan  I discussed advance care planning with the patient for at least 17 minutes, including diagnosis, prognosis, plan of care, risks and benefits of any therapies that could be offered, as well as alternatives including palliation and hospice, as appropriate.    Patient is confirmed FC/FC  No acp documents on file  Wants his mom to be his medical decision maker if he is deemed incapacitated      Dyslipidemia- (present on admission)  Assessment & Plan  Continue outpatient statin    Tobacco abuse- (present on admission)  Assessment & Plan  Smokeless tobacco, encouraged cessation    Type 2 diabetes mellitus with hyperglycemia, without long-term current use of insulin (HCC)- (present on admission)  Assessment & Plan  Check A1c  Continue ISS, adjust prn    Bipolar 2 disorder, major depressive episode (HCC)- (present on admission)  Assessment & Plan  Continue outpatient medications    Hypertension- (present on admission)  Assessment & " Plan  Continue outpatient BP medications        VTE prophylaxis: SCDs/TEDs and enoxaparin ppx

## 2025-03-07 NOTE — PROGRESS NOTES
To whom this may concern,      Please excuse El Duran III from his work duties on 3/6/2025 and 3/7/2025 for medical issues requiring hospitalization. He can return to work on 3/10/2025 without restrictions.      Sincerely,      Osmany Jorge MD

## 2025-03-07 NOTE — ASSESSMENT & PLAN NOTE
I discussed advance care planning with the patient for at least 17 minutes, including diagnosis, prognosis, plan of care, risks and benefits of any therapies that could be offered, as well as alternatives including palliation and hospice, as appropriate.    Patient is confirmed FC/FC  No acp documents on file  Wants his mom to be his medical decision maker if he is deemed incapacitated

## 2025-06-26 ENCOUNTER — HOSPITAL ENCOUNTER (OUTPATIENT)
Dept: LAB | Facility: MEDICAL CENTER | Age: 60
End: 2025-06-26
Payer: MEDICAID

## 2025-06-26 PROCEDURE — 86480 TB TEST CELL IMMUN MEASURE: CPT

## 2025-06-26 PROCEDURE — 36415 COLL VENOUS BLD VENIPUNCTURE: CPT

## 2025-06-27 LAB
GAMMA INTERFERON BACKGROUND BLD IA-ACNC: 0.02 IU/ML
M TB IFN-G BLD-IMP: NEGATIVE
M TB IFN-G CD4+ BCKGRND COR BLD-ACNC: 0.07 IU/ML
MITOGEN IGNF BCKGRD COR BLD-ACNC: >10 IU/ML
QFT TB2 - NIL TBQ2: 0.06 IU/ML

## 2025-07-02 ENCOUNTER — APPOINTMENT (OUTPATIENT)
Dept: RADIOLOGY | Facility: MEDICAL CENTER | Age: 60
End: 2025-07-02
Payer: MEDICAID

## 2025-07-03 ENCOUNTER — APPOINTMENT (OUTPATIENT)
Dept: RADIOLOGY | Facility: MEDICAL CENTER | Age: 60
End: 2025-07-03
Payer: MEDICAID

## 2025-07-07 ENCOUNTER — APPOINTMENT (OUTPATIENT)
Dept: RADIOLOGY | Facility: MEDICAL CENTER | Age: 60
End: 2025-07-07
Payer: MEDICAID

## 2025-07-09 ENCOUNTER — APPOINTMENT (OUTPATIENT)
Dept: RADIOLOGY | Facility: MEDICAL CENTER | Age: 60
End: 2025-07-09
Payer: MEDICAID

## 2025-07-25 ENCOUNTER — HOSPITAL ENCOUNTER (OUTPATIENT)
Dept: RADIOLOGY | Facility: MEDICAL CENTER | Age: 60
End: 2025-07-25
Payer: MEDICAID

## 2025-07-25 DIAGNOSIS — R10.11 ABDOMINAL PAIN, RIGHT UPPER QUADRANT: ICD-10-CM

## 2025-07-25 PROCEDURE — 76705 ECHO EXAM OF ABDOMEN: CPT
